# Patient Record
Sex: MALE | Race: WHITE | Employment: OTHER | URBAN - METROPOLITAN AREA
[De-identification: names, ages, dates, MRNs, and addresses within clinical notes are randomized per-mention and may not be internally consistent; named-entity substitution may affect disease eponyms.]

---

## 2019-01-30 ENCOUNTER — HOSPITAL ENCOUNTER (EMERGENCY)
Age: 69
Discharge: HOME | End: 2019-01-30
Attending: EMERGENCY MEDICINE
Payer: MEDICARE

## 2019-01-30 ENCOUNTER — APPOINTMENT (OUTPATIENT)
Dept: GENERAL RADIOLOGY | Age: 69
End: 2019-01-30
Payer: MEDICARE

## 2019-01-30 VITALS
BODY MASS INDEX: 49.44 KG/M2 | DIASTOLIC BLOOD PRESSURE: 78 MMHG | HEIGHT: 67 IN | WEIGHT: 315 LBS | SYSTOLIC BLOOD PRESSURE: 185 MMHG | RESPIRATION RATE: 20 BRPM | TEMPERATURE: 98.4 F | OXYGEN SATURATION: 95 % | HEART RATE: 72 BPM

## 2019-01-30 DIAGNOSIS — R06.02 SHORTNESS OF BREATH: Primary | ICD-10-CM

## 2019-01-30 LAB
ANION GAP SERPL CALCULATED.3IONS-SCNC: 16 MMOL/L (ref 3–16)
BASOPHILS ABSOLUTE: 0.1 K/UL (ref 0–0.2)
BASOPHILS RELATIVE PERCENT: 0.6 %
BUN BLDV-MCNC: 12 MG/DL (ref 7–20)
CALCIUM SERPL-MCNC: 9.7 MG/DL (ref 8.3–10.6)
CHLORIDE BLD-SCNC: 97 MMOL/L (ref 99–110)
CO2: 26 MMOL/L (ref 21–32)
CREAT SERPL-MCNC: 0.7 MG/DL (ref 0.8–1.3)
EOSINOPHILS ABSOLUTE: 0.2 K/UL (ref 0–0.6)
EOSINOPHILS RELATIVE PERCENT: 2 %
GFR AFRICAN AMERICAN: >60
GFR NON-AFRICAN AMERICAN: >60
GLUCOSE BLD-MCNC: 197 MG/DL (ref 70–99)
HCT VFR BLD CALC: 45.5 % (ref 40.5–52.5)
HEMOGLOBIN: 15.1 G/DL (ref 13.5–17.5)
LYMPHOCYTES ABSOLUTE: 1.7 K/UL (ref 1–5.1)
LYMPHOCYTES RELATIVE PERCENT: 20.5 %
MAGNESIUM: 1.7 MG/DL (ref 1.8–2.4)
MCH RBC QN AUTO: 31.8 PG (ref 26–34)
MCHC RBC AUTO-ENTMCNC: 33.2 G/DL (ref 31–36)
MCV RBC AUTO: 95.9 FL (ref 80–100)
MONOCYTES ABSOLUTE: 0.8 K/UL (ref 0–1.3)
MONOCYTES RELATIVE PERCENT: 9.1 %
NEUTROPHILS ABSOLUTE: 5.6 K/UL (ref 1.7–7.7)
NEUTROPHILS RELATIVE PERCENT: 67.8 %
PDW BLD-RTO: 14.6 % (ref 12.4–15.4)
PLATELET # BLD: 223 K/UL (ref 135–450)
PMV BLD AUTO: 7.7 FL (ref 5–10.5)
POTASSIUM SERPL-SCNC: 3.9 MMOL/L (ref 3.5–5.1)
PRO-BNP: 21 PG/ML (ref 0–124)
RAPID INFLUENZA  B AGN: NEGATIVE
RAPID INFLUENZA A AGN: NEGATIVE
RBC # BLD: 4.75 M/UL (ref 4.2–5.9)
SODIUM BLD-SCNC: 139 MMOL/L (ref 136–145)
TROPONIN: <0.01 NG/ML
WBC # BLD: 8.3 K/UL (ref 4–11)

## 2019-01-30 PROCEDURE — 84443 ASSAY THYROID STIM HORMONE: CPT

## 2019-01-30 PROCEDURE — 83735 ASSAY OF MAGNESIUM: CPT

## 2019-01-30 PROCEDURE — 83880 ASSAY OF NATRIURETIC PEPTIDE: CPT

## 2019-01-30 PROCEDURE — 80048 BASIC METABOLIC PNL TOTAL CA: CPT

## 2019-01-30 PROCEDURE — 93005 ELECTROCARDIOGRAM TRACING: CPT | Performed by: EMERGENCY MEDICINE

## 2019-01-30 PROCEDURE — 71046 X-RAY EXAM CHEST 2 VIEWS: CPT

## 2019-01-30 PROCEDURE — 85025 COMPLETE CBC W/AUTO DIFF WBC: CPT

## 2019-01-30 PROCEDURE — 87804 INFLUENZA ASSAY W/OPTIC: CPT

## 2019-01-30 PROCEDURE — 99285 EMERGENCY DEPT VISIT HI MDM: CPT

## 2019-01-30 PROCEDURE — 84484 ASSAY OF TROPONIN QUANT: CPT

## 2019-01-30 RX ORDER — ASPIRIN 81 MG/1
81 TABLET ORAL DAILY
COMMUNITY

## 2019-01-30 RX ORDER — OXYCODONE HYDROCHLORIDE 20 MG/1
15 TABLET ORAL EVERY 6 HOURS PRN
COMMUNITY

## 2019-01-30 RX ORDER — PROMETHAZINE HYDROCHLORIDE 25 MG/1
25 TABLET ORAL
COMMUNITY
Start: 2018-04-12

## 2019-01-30 RX ORDER — DESVENLAFAXINE 100 MG/1
100 TABLET, EXTENDED RELEASE ORAL 2 TIMES DAILY
COMMUNITY
Start: 2018-12-10

## 2019-01-30 RX ORDER — TESTOSTERONE CYPIONATE 200 MG/ML
100 INJECTION INTRAMUSCULAR WEEKLY
COMMUNITY
Start: 2018-12-10 | End: 2020-09-15

## 2019-01-30 RX ORDER — ALBUTEROL SULFATE 90 UG/1
2 AEROSOL, METERED RESPIRATORY (INHALATION)
Status: ON HOLD | COMMUNITY
Start: 2018-10-11 | End: 2020-10-16

## 2019-01-30 RX ORDER — TAMSULOSIN HYDROCHLORIDE 0.4 MG/1
0.4 CAPSULE ORAL 2 TIMES DAILY
COMMUNITY
Start: 2018-12-20

## 2019-01-30 RX ORDER — AMLODIPINE BESYLATE 5 MG/1
10 TABLET ORAL DAILY
COMMUNITY
Start: 2018-12-17

## 2019-01-30 RX ORDER — OXYCODONE HYDROCHLORIDE 20 MG/1
20 TABLET ORAL EVERY 6 HOURS PRN
COMMUNITY
Start: 2019-01-27 | End: 2019-01-30 | Stop reason: SDUPTHER

## 2019-01-30 RX ORDER — AMOXICILLIN 500 MG
1 CAPSULE ORAL 2 TIMES DAILY
COMMUNITY

## 2019-01-30 RX ORDER — LISINOPRIL 20 MG/1
20 TABLET ORAL 2 TIMES DAILY
COMMUNITY
Start: 2018-12-20

## 2019-01-30 RX ORDER — TADALAFIL 20 MG/1
20 TABLET ORAL PRN
COMMUNITY
Start: 2018-11-08

## 2019-01-30 RX ORDER — IBUPROFEN 800 MG/1
800 TABLET ORAL 3 TIMES DAILY
COMMUNITY
Start: 2018-09-11

## 2019-01-30 RX ORDER — TOPIRAMATE 25 MG/1
25 TABLET ORAL DAILY
Status: ON HOLD | COMMUNITY
Start: 2018-10-26 | End: 2020-09-15

## 2019-01-30 RX ORDER — CARVEDILOL 12.5 MG/1
25 TABLET ORAL 2 TIMES DAILY
COMMUNITY
Start: 2018-12-20

## 2019-01-31 LAB
EKG ATRIAL RATE: 71 BPM
EKG DIAGNOSIS: NORMAL
EKG P AXIS: 44 DEGREES
EKG P-R INTERVAL: 184 MS
EKG Q-T INTERVAL: 348 MS
EKG QRS DURATION: 100 MS
EKG QTC CALCULATION (BAZETT): 378 MS
EKG R AXIS: 13 DEGREES
EKG T AXIS: 61 DEGREES
EKG VENTRICULAR RATE: 71 BPM

## 2019-02-01 LAB — TSH REFLEX: 1.73 UIU/ML (ref 0.27–4.2)

## 2019-11-08 ENCOUNTER — HOSPITAL ENCOUNTER (OUTPATIENT)
Dept: CT IMAGING | Age: 69
Discharge: HOME | End: 2019-11-08
Payer: MEDICARE

## 2019-11-08 ENCOUNTER — HOSPITAL ENCOUNTER (OUTPATIENT)
Dept: GENERAL RADIOLOGY | Age: 69
Discharge: HOME | End: 2019-11-08
Payer: MEDICARE

## 2019-11-08 VITALS
DIASTOLIC BLOOD PRESSURE: 69 MMHG | RESPIRATION RATE: 16 BRPM | TEMPERATURE: 97.9 F | SYSTOLIC BLOOD PRESSURE: 143 MMHG | OXYGEN SATURATION: 94 % | HEART RATE: 79 BPM

## 2019-11-08 DIAGNOSIS — M54.16 LUMBAR RADICULOPATHY: ICD-10-CM

## 2019-11-08 PROCEDURE — 72265 MYELOGRAPHY L-S SPINE: CPT

## 2019-11-08 PROCEDURE — 6360000004 HC RX CONTRAST MEDICATION: Performed by: NURSE PRACTITIONER

## 2019-11-08 PROCEDURE — 72132 CT LUMBAR SPINE W/DYE: CPT

## 2019-11-08 PROCEDURE — 2500000003 HC RX 250 WO HCPCS: Performed by: NURSE PRACTITIONER

## 2019-11-08 PROCEDURE — 7100000011 HC PHASE II RECOVERY - ADDTL 15 MIN

## 2019-11-08 PROCEDURE — 7100000010 HC PHASE II RECOVERY - FIRST 15 MIN

## 2019-11-08 RX ORDER — LIDOCAINE HYDROCHLORIDE 10 MG/ML
5 INJECTION, SOLUTION EPIDURAL; INFILTRATION; INTRACAUDAL; PERINEURAL ONCE
Status: COMPLETED | OUTPATIENT
Start: 2019-11-08 | End: 2019-11-08

## 2019-11-08 RX ADMIN — IOHEXOL 12 ML: 240 INJECTION, SOLUTION INTRATHECAL; INTRAVASCULAR; INTRAVENOUS; ORAL at 10:37

## 2019-11-08 RX ADMIN — LIDOCAINE HYDROCHLORIDE 5 ML: 10 INJECTION, SOLUTION EPIDURAL; INFILTRATION; INTRACAUDAL; PERINEURAL at 10:36

## 2019-11-08 ASSESSMENT — PAIN SCALES - GENERAL: PAINLEVEL_OUTOF10: 0

## 2020-09-02 ENCOUNTER — HOSPITAL ENCOUNTER (EMERGENCY)
Age: 70
Discharge: HOME | End: 2020-09-02
Attending: EMERGENCY MEDICINE
Payer: MEDICARE

## 2020-09-02 ENCOUNTER — APPOINTMENT (OUTPATIENT)
Dept: GENERAL RADIOLOGY | Age: 70
End: 2020-09-02
Payer: MEDICARE

## 2020-09-02 VITALS
RESPIRATION RATE: 18 BRPM | HEART RATE: 64 BPM | TEMPERATURE: 98.3 F | DIASTOLIC BLOOD PRESSURE: 63 MMHG | SYSTOLIC BLOOD PRESSURE: 138 MMHG | OXYGEN SATURATION: 94 %

## 2020-09-02 LAB
ANION GAP SERPL CALCULATED.3IONS-SCNC: 11 MMOL/L (ref 3–16)
BASE EXCESS VENOUS: 1.3 MMOL/L (ref -2–3)
BASOPHILS ABSOLUTE: 0.1 K/UL (ref 0–0.2)
BASOPHILS RELATIVE PERCENT: 0.6 %
BUN BLDV-MCNC: 27 MG/DL (ref 7–20)
CALCIUM SERPL-MCNC: 9.9 MG/DL (ref 8.3–10.6)
CARBOXYHEMOGLOBIN: 1.8 % (ref 0–1.5)
CHLORIDE BLD-SCNC: 99 MMOL/L (ref 99–110)
CHP ED QC CHECK: YES
CO2: 26 MMOL/L (ref 21–32)
CREAT SERPL-MCNC: 1.3 MG/DL (ref 0.8–1.3)
EKG ATRIAL RATE: 67 BPM
EKG DIAGNOSIS: NORMAL
EKG P AXIS: 56 DEGREES
EKG P-R INTERVAL: 172 MS
EKG Q-T INTERVAL: 344 MS
EKG QRS DURATION: 96 MS
EKG QTC CALCULATION (BAZETT): 363 MS
EKG R AXIS: 4 DEGREES
EKG T AXIS: 53 DEGREES
EKG VENTRICULAR RATE: 67 BPM
EOSINOPHILS ABSOLUTE: 0.2 K/UL (ref 0–0.6)
EOSINOPHILS RELATIVE PERCENT: 2 %
GFR AFRICAN AMERICAN: >60
GFR NON-AFRICAN AMERICAN: 55
GLUCOSE BLD-MCNC: 129 MG/DL (ref 70–99)
GLUCOSE BLD-MCNC: 135 MG/DL
GLUCOSE BLD-MCNC: 135 MG/DL (ref 70–99)
HCO3 VENOUS: 26.5 MMOL/L (ref 24–28)
HCT VFR BLD CALC: 48.3 % (ref 40.5–52.5)
HEMOGLOBIN, VEN, REDUCED: 10.6 %
HEMOGLOBIN: 16.4 G/DL (ref 13.5–17.5)
LYMPHOCYTES ABSOLUTE: 1.9 K/UL (ref 1–5.1)
LYMPHOCYTES RELATIVE PERCENT: 17.7 %
MCH RBC QN AUTO: 32.9 PG (ref 26–34)
MCHC RBC AUTO-ENTMCNC: 34 G/DL (ref 31–36)
MCV RBC AUTO: 96.8 FL (ref 80–100)
METHEMOGLOBIN VENOUS: 0.9 % (ref 0–1.5)
MONOCYTES ABSOLUTE: 1.1 K/UL (ref 0–1.3)
MONOCYTES RELATIVE PERCENT: 10.3 %
NEUTROPHILS ABSOLUTE: 7.3 K/UL (ref 1.7–7.7)
NEUTROPHILS RELATIVE PERCENT: 69.4 %
O2 SAT, VEN: 89 %
PCO2, VEN: 45.6 MMHG (ref 41–51)
PDW BLD-RTO: 15.1 % (ref 12.4–15.4)
PERFORMED ON: ABNORMAL
PH VENOUS: 7.38 (ref 7.35–7.45)
PLATELET # BLD: 195 K/UL (ref 135–450)
PMV BLD AUTO: 7.8 FL (ref 5–10.5)
PO2, VEN: 60.8 MMHG (ref 25–40)
POTASSIUM SERPL-SCNC: 4.4 MMOL/L (ref 3.5–5.1)
PRO-BNP: 27 PG/ML (ref 0–124)
RBC # BLD: 4.99 M/UL (ref 4.2–5.9)
SODIUM BLD-SCNC: 136 MMOL/L (ref 136–145)
TCO2 CALC VENOUS: 28 MMOL/L
TROPONIN: <0.01 NG/ML
WBC # BLD: 10.6 K/UL (ref 4–11)

## 2020-09-02 PROCEDURE — 99285 EMERGENCY DEPT VISIT HI MDM: CPT

## 2020-09-02 PROCEDURE — 83880 ASSAY OF NATRIURETIC PEPTIDE: CPT

## 2020-09-02 PROCEDURE — 93005 ELECTROCARDIOGRAM TRACING: CPT | Performed by: EMERGENCY MEDICINE

## 2020-09-02 PROCEDURE — 71046 X-RAY EXAM CHEST 2 VIEWS: CPT

## 2020-09-02 PROCEDURE — 85025 COMPLETE CBC W/AUTO DIFF WBC: CPT

## 2020-09-02 PROCEDURE — U0003 INFECTIOUS AGENT DETECTION BY NUCLEIC ACID (DNA OR RNA); SEVERE ACUTE RESPIRATORY SYNDROME CORONAVIRUS 2 (SARS-COV-2) (CORONAVIRUS DISEASE [COVID-19]), AMPLIFIED PROBE TECHNIQUE, MAKING USE OF HIGH THROUGHPUT TECHNOLOGIES AS DESCRIBED BY CMS-2020-01-R: HCPCS

## 2020-09-02 PROCEDURE — 36415 COLL VENOUS BLD VENIPUNCTURE: CPT

## 2020-09-02 PROCEDURE — 82803 BLOOD GASES ANY COMBINATION: CPT

## 2020-09-02 PROCEDURE — 6370000000 HC RX 637 (ALT 250 FOR IP): Performed by: EMERGENCY MEDICINE

## 2020-09-02 PROCEDURE — 84484 ASSAY OF TROPONIN QUANT: CPT

## 2020-09-02 PROCEDURE — 80048 BASIC METABOLIC PNL TOTAL CA: CPT

## 2020-09-02 RX ORDER — HYDROXYZINE PAMOATE 25 MG/1
25 CAPSULE ORAL ONCE
Status: COMPLETED | OUTPATIENT
Start: 2020-09-02 | End: 2020-09-02

## 2020-09-02 RX ADMIN — HYDROXYZINE PAMOATE 25 MG: 25 CAPSULE ORAL at 05:17

## 2020-09-02 ASSESSMENT — ENCOUNTER SYMPTOMS
COUGH: 0
EYES NEGATIVE: 1
GASTROINTESTINAL NEGATIVE: 1
SHORTNESS OF BREATH: 1

## 2020-09-02 NOTE — ED PROVIDER NOTES
[address] [facility]          ATTENDING PHYSICIAN NOTE       Date of evaluation: 9/2/2020    Chief Complaint     Panic Attack and Shortness of Breath (with lying down at home but also states SOB sitting up in bed while here)      History of Present Illness     Valerio Tong is a 78 y.o. male who presents to the emergency department complaining of shortness of breath. Patient states that he is felt fatigued throughout the day today and had increase of his chronic back pain but he attributed this to moving several boxes during the day. He states tonight when he tried to go to sleep he felt very short of breath whenever he lay down flat. He states he does have some shortness of breath when sitting up as well but is worse with laying flat. He denies any chest pain or pressure. He denies any fevers or chills. He denies any cough. He denies any swelling or pain in his extremities. He denies any recent sick contacts. He states he has had something similar happened to him in the past but does not know the etiology. Review of Systems     Review of Systems   Constitutional: Negative. HENT: Negative. Eyes: Negative. Respiratory: Positive for shortness of breath. Negative for cough. Cardiovascular: Negative. Gastrointestinal: Negative. Genitourinary: Negative. Musculoskeletal: Negative. Neurological: Negative. All other systems reviewed and are negative. Past Medical, Surgical, Family, and Social History     He has a past medical history of Diabetes mellitus ([address] Hypertension, and Sleep apnea. He has no past surgical history on file. His family history is not on file. He reports that he has never smoked. He has never used smokeless tobacco. He reports current alcohol use. He reports current drug use. Drug: Marijuana.     Medications     Current Discharge Medication List      CONTINUE these medications which have NOT CHANGED    Details albuterol sulfate HFA (PROAIR HFA) 108 (90 Base) MCG/ACT inhaler Inhale 2 puffs into the lungs      amLODIPine (NORVASC) 5 MG tablet Take 5 mg by mouth daily      aspirin 81 MG EC tablet Take 81 mg by mouth daily      carvedilol (COREG) 12.5 MG tablet Take 12.5 mg by mouth 2 times daily      vitamin D (CHOLECALCIFEROL) 31951 units CAPS capsule Take 10,000 Units by mouth 2 times daily      desvenlafaxine succinate (PRISTIQ) 100 MG TB24 extended release tablet Take 100 mg by mouth 2 times daily      ibuprofen (IBU) 800 MG tablet Take 800 mg by mouth three times daily      insulin NPH (HUMULIN N) 100 UNIT/ML injection vial Inject 60 Units into the skin 2 times daily      lisinopril (PRINIVIL;ZESTRIL) 20 MG tablet Take 20 mg by mouth 2 times daily      metFORMIN (GLUCOPHAGE) 1000 MG tablet Take 1,000 mg by mouth 2 times daily (with meals)      Multiple Vitamin (MULTI-DAY VITAMINS PO) Take 1 tablet by mouth 2 times daily      Omega-3 Fatty Acids (FISH OIL) 1200 MG CAPS Take 1 capsule by mouth 2 times daily      oxyCODONE (ROXICODONE) 20 MG immediate release tablet Take 20 mg by mouth every 6 hours as needed for Pain. .      promethazine (PHENERGAN) 25 MG tablet Take 25 mg by mouth every 4-6 hours as needed for Nausea      tadalafil (CIALIS) 20 MG tablet Take 20 mg by mouth as needed      tamsulosin (FLOMAX) 0.4 MG capsule Take 0.4 mg by mouth 2 times daily      testosterone cypionate (DEPOTESTOTERONE CYPIONATE) 200 MG/ML injection Inject 200 mg into the muscle every 21 days. Alma Boop topiramate (TOPAMAX) 25 MG tablet Take 25 mg by mouth daily             Allergies     He has No Known Allergies. Physical Exam     INITIAL VITALS: BP: (!) 154/68, Temp: 98.3 °F (36.8 °C), Pulse: 64, Resp: 18, SpO2: 91 %   Physical Exam  Vitals signs and nursing note reviewed. Constitutional:       General: He is not in acute distress. Appearance: He is obese. HENT:      Head: Normocephalic and atraumatic.       Mouth/Throat: Mouth: Mucous membranes are moist.      Pharynx: No oropharyngeal exudate. Eyes:      General: No scleral icterus. Extraocular Movements: Extraocular movements intact. Conjunctiva/sclera: Conjunctivae normal.      Pupils: Pupils are equal, round, and reactive to light. Neck:      Musculoskeletal: Normal range of motion and neck supple. Cardiovascular:      Rate and Rhythm: Normal rate and regular rhythm. Heart sounds: Normal heart sounds. Pulmonary:      Effort: Pulmonary effort is normal.      Breath sounds: Normal breath sounds. No wheezing, rhonchi or rales. Abdominal:      General: Bowel sounds are normal.      Palpations: Abdomen is soft. Tenderness: There is no abdominal tenderness. There is no guarding or rebound. Musculoskeletal: Normal range of motion. Right lower leg: Edema present. Left lower leg: Edema present. Comments: Trace to 1+ pitting edema to the bilateral lower extremities. Skin:     General: Skin is warm and dry. Neurological:      General: No focal deficit present. Mental Status: He is alert and oriented to person, place, and time. Cranial Nerves: No cranial nerve deficit. Motor: No weakness. Coordination: Coordination normal.         Diagnostic Results     EKG   EKG as interpreted by me shows patient to be in a normal sinus rhythm with a normal axis, normal LA and QT intervals, normal QRS duration, no ST segment abnormalities, no T wave abnormalities, occasional PVCs present. RADIOLOGY:  XR CHEST (2 VW)   Final Result   Cardiomegaly. No acute pulmonary findings.               LABS:   Results for orders placed or performed during the hospital encounter of 09/02/20   CBC auto differential   Result Value Ref Range    WBC 10.6 4.0 - 11.0 K/uL    RBC 4.99 4.20 - 5.90 M/uL    Hemoglobin 16.4 13.5 - 17.5 g/dL    Hematocrit 48.3 40.5 - 52.5 %    MCV 96.8 80.0 - 100.0 fL    MCH 32.9 26.0 - 34.0 pg    MCHC 34.0 31.0 - 36.0 g/dL RDW 15.1 12.4 - 15.4 %    Platelets 669 115 - 216 K/uL    MPV 7.8 5.0 - 10.5 fL    Neutrophils % 69.4 %    Lymphocytes % 17.7 %    Monocytes % 10.3 %    Eosinophils % 2.0 %    Basophils % 0.6 %    Neutrophils Absolute 7.3 1.7 - 7.7 K/uL    Lymphocytes Absolute 1.9 1.0 - 5.1 K/uL    Monocytes Absolute 1.1 0.0 - 1.3 K/uL    Eosinophils Absolute 0.2 0.0 - 0.6 K/uL    Basophils Absolute 0.1 0.0 - 0.2 K/uL   Basic Metabolic Panel (EP - 1)   Result Value Ref Range    Sodium 136 136 - 145 mmol/L    Potassium 4.4 3.5 - 5.1 mmol/L    Chloride 99 99 - 110 mmol/L    CO2 26 21 - 32 mmol/L    Anion Gap 11 3 - 16    Glucose 129 (H) 70 - 99 mg/dL    BUN 27 (H) 7 - 20 mg/dL    CREATININE 1.3 0.8 - 1.3 mg/dL    GFR Non-African American 55 (A) >60    GFR African American >60 >60    Calcium 9.9 8.3 - 10.6 mg/dL   Troponin   Result Value Ref Range    Troponin <0.01 <0.01 ng/mL   Brain Natriuretic Peptide   Result Value Ref Range    Pro-BNP 27 0 - 124 pg/mL   Blood Gas, Venous   Result Value Ref Range    pH, Lake 7.381 7.350 - 7.450    pCO2, Lake 45.6 41.0 - 51.0 mmHg    pO2, Lake 60.8 (H) 25.0 - 40.0 mmHg    HCO3, Venous 26.5 24.0 - 28.0 mmol/L    Base Excess, Lake 1.3 -2.0 - 3.0 mmol/L    O2 Sat, Lake 89 Not established %    Carboxyhemoglobin 1.8 (H) 0.0 - 1.5 %    MetHgb, Lake 0.9 0.0 - 1.5 %    TC02 (Calc), [place] 28 mmol/L    Hemoglobin, Lake, Reduced 10.60 %     RECENT VITALS:  BP: 138/63,Temp: 98.3 °F (36.8 °C), Pulse: 64, Resp: 18, SpO2: 94 %     Procedures     N/A    ED Course     Nursing Notes, Past Medical Hx, Past Surgical Hx, Social Hx,Allergies, and Family Hx were reviewed. patient was given the following medications:  Orders Placed This Encounter   Medications    hydrOXYzine (VISTARIL) capsule 25 mg       CONSULTS:  None    MEDICAL DECISIONMAKING / ASSESSMENT / Edgard Lenin is a 78 y.o. male presents complaining of shortness of breath.   Patient states he is felt short of breath and had increased chronic pain over the last day but tonight was unable to fall asleep when laying down flat. Patient has clear breath sounds and normal heart sounds. Vital signs are stable. Chest x-ray shows no acute airspace disease. Laboratory studies are unremarkable including negative troponin, normal BNP, and normal venous blood gas. Patient did request to nurse and myself multiple times for Ativan to help with his symptoms, so I am concerned for possibility of drug-seeking behavior. Patient was given Vistaril for symptomatic relief. COVID swab was sent and the patient will be instructed to self quarantine. Patient will be instructed to continue his current medications, follow-up with his primary care provider as well as his pain management specialist.    Clinical Impression     1.  Shortness of breath        Disposition     PATIENT REFERRED TO:  [facility], [address] [place] [address]  [phone]            DISCHARGE MEDICATIONS:  Current Discharge Medication List          DISPOSITION          Cas Lentz MD  09/02/20 8063

## 2020-09-02 NOTE — ED NOTES
Pt states that he is here because he feels extremely anxious. Pt states he feels as though he cannot catch his breath when he lays down and then he stated that he cannot catch his breath when he is standing either. 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 Pt also stated that his pain doctor has been decreasing his pain medications and he dosen't understand why because he was good for 29 years and now he is in pain all of the time because of his career and he just wants to feel normal and he cant feel normal without narcotic pain medication. Pt stated that he has been arguing with his pain management doctor lately because he keeps decreasing his pain medication and he has been spending more and more time in bed because he is in so much pain and his doctor dosen't understand and he feels like everything would be better if his doctor would give him his normal dose of pain medication. Pt asked if the ED MD would be willing to give him some here. I informed the pt that I would speak with the MD about IVP Ativan and pain medication.       December CHRIS Mayes  09/02/20 0629

## 2020-09-02 NOTE — ED NOTES
EKG completed and reviewed by Dr. Jamee Flores. Dr. Jamee Flores also aware that pt is requesting pain medication along with IVP Ativan.       Clarence Leal RN  09/02/20 8104

## 2020-09-04 LAB — SARS-COV-2, NAA: NOT DETECTED

## 2020-09-09 ENCOUNTER — OFFICE VISIT (OUTPATIENT)
Dept: PRIMARY CARE CLINIC | Age: 70
End: 2020-09-09
Payer: MEDICARE

## 2020-09-09 PROCEDURE — G8421 BMI NOT CALCULATED: HCPCS | Performed by: NURSE PRACTITIONER

## 2020-09-09 PROCEDURE — 99211 OFF/OP EST MAY X REQ PHY/QHP: CPT | Performed by: NURSE PRACTITIONER

## 2020-09-09 PROCEDURE — G8428 CUR MEDS NOT DOCUMENT: HCPCS | Performed by: NURSE PRACTITIONER

## 2020-09-11 LAB — SARS-COV-2, NAA: NOT DETECTED

## 2020-09-14 NOTE — PRE-PROCEDURE INSTRUCTIONS
PATIENT REACHED   YES_x___NO____    PREOP INSTUCTIONS LEFT ON VM NUMBER_______________      FXTO__[id number]_______ TIME_1145________ARRIVAL_1100_______PLACE____________  NOTHING TO EAT OR DRINK  AFTER MIDNIGHT THE EVENING PRIOR OR AS INSTRUCTED BY YOUR DR.  Catherine Parks NEED A RESPONSIBLE ADULT AGE 18 OR OLDER TO DRIVE YOU HOME  PLEASE BRING INSURANCE CARD. PICTURE ID AND COMPLETE LIST OF MEDS  WEAR LOOSE COMFORTABLE CLOTHING  FOLLOW ANY INSTRUCTIONS YOUR DRS OFFICE HAS GIVEN YOU,INCLUDING WHAT MEDICATIONS TO TAKE THE AM OF PROCEDURE AND WHEN AND IF YOU NEED TO STOP ANY BLOOD THINNERS. IF YOU HAVE QUESTIONS REGARDING THIS CALL THE OFFICE  THE GOAL BLOOD SUGAR THE AM OF PROCEDURE  OR LESS ABOVE THAT THE PROCEDURE MAY BE CANCELLED  ANY QUESTIONS CALL YOUR DOCTOR. ALSO,PLEASE READ THE INSTRUCTION PACKET FROM YOUR DR IF YOU RECEIVED ONE.   SPINE INTERVENTION NUMBER [phone]

## 2020-09-15 ENCOUNTER — HOSPITAL ENCOUNTER (OUTPATIENT)
Age: 70
Setting detail: OUTPATIENT SURGERY
Discharge: HOME | End: 2020-09-15
Attending: ANESTHESIOLOGY | Admitting: ANESTHESIOLOGY
Payer: MEDICARE

## 2020-09-15 ENCOUNTER — APPOINTMENT (OUTPATIENT)
Dept: GENERAL RADIOLOGY | Age: 70
End: 2020-09-15
Attending: ANESTHESIOLOGY
Payer: MEDICARE

## 2020-09-15 VITALS
BODY MASS INDEX: 47.87 KG/M2 | TEMPERATURE: 98.6 F | OXYGEN SATURATION: 93 % | SYSTOLIC BLOOD PRESSURE: 128 MMHG | HEIGHT: 67 IN | WEIGHT: 305 LBS | DIASTOLIC BLOOD PRESSURE: 34 MMHG | HEART RATE: 62 BPM | RESPIRATION RATE: 16 BRPM

## 2020-09-15 LAB
GLUCOSE BLD-MCNC: 150 MG/DL (ref 70–99)
PERFORMED ON: ABNORMAL

## 2020-09-15 PROCEDURE — 2709999900 HC NON-CHARGEABLE SUPPLY: Performed by: ANESTHESIOLOGY

## 2020-09-15 PROCEDURE — 77003 FLUOROGUIDE FOR SPINE INJECT: CPT

## 2020-09-15 PROCEDURE — 3610000054 HC PAIN LEVEL 3 BASE (NON-OR): Performed by: ANESTHESIOLOGY

## 2020-09-15 PROCEDURE — 2580000003 HC RX 258: Performed by: ANESTHESIOLOGY

## 2020-09-15 PROCEDURE — 3610000055 HC PAIN LEVEL 3 ADDL 15 MIN (NON-OR): Performed by: ANESTHESIOLOGY

## 2020-09-15 PROCEDURE — 6360000002 HC RX W HCPCS: Performed by: ANESTHESIOLOGY

## 2020-09-15 RX ORDER — ATORVASTATIN CALCIUM 20 MG/1
20 TABLET, FILM COATED ORAL DAILY
COMMUNITY

## 2020-09-15 RX ORDER — SODIUM CHLORIDE 9 MG/ML
1000 INJECTION, SOLUTION INTRAVENOUS ONCE
Status: COMPLETED | OUTPATIENT
Start: 2020-09-15 | End: 2020-09-15

## 2020-09-15 RX ORDER — MORPHINE SULFATE 1 MG/ML
INJECTION, SOLUTION EPIDURAL; INTRATHECAL; INTRAVENOUS
Status: COMPLETED | OUTPATIENT
Start: 2020-09-15 | End: 2020-09-15

## 2020-09-15 RX ADMIN — SODIUM CHLORIDE 1000 ML: 9 INJECTION, SOLUTION INTRAVENOUS at 11:47

## 2020-09-15 ASSESSMENT — PAIN SCALES - GENERAL
PAINLEVEL_OUTOF10: 8
PAINLEVEL_OUTOF10: 4

## 2020-09-15 ASSESSMENT — PAIN DESCRIPTION - PAIN TYPE: TYPE: CHRONIC PAIN

## 2020-09-15 ASSESSMENT — PAIN - FUNCTIONAL ASSESSMENT: PAIN_FUNCTIONAL_ASSESSMENT: 0-10

## 2020-09-15 ASSESSMENT — PAIN DESCRIPTION - DESCRIPTORS: DESCRIPTORS: NUMBNESS;TINGLING

## 2020-09-15 NOTE — PROGRESS NOTES
Pt able to walk to car at discharge without difficulty. Pt standing and walking in department for 10 minutes prior to discharge without difficulty.

## 2020-09-15 NOTE — PROGRESS NOTES
Pt is up walking, eating and drinking. Pt states his pain is 4 out of 10 from 8 out of 10 prior to procedure. Pt states he is moving easier.

## 2020-09-22 NOTE — OP NOTE
Operative Note      Patient: Tatyana Baig  YOB: 1950  MRN: [id number]    Date of Procedure: 9/15/2020    Pre-Op Diagnosis: M96.1    Post-Op Diagnosis: Same       Procedure(s):  PAIN PUMP TRIAL WITH FLUOROSCOPY    Surgeon(s):  Bigg Griffin MD    Assistant:   * No surgical staff found *    Anesthesia: Local    Estimated Blood Loss (mL): Minimal    Complications: None    Specimens:   * No specimens in log *    Implants:  * No implants in log *      Drains: * No LDAs found *    Findings:     Detailed Description of Procedure:   INDICATIONS: The patient has a longstanding history of chronic pain. She failed all other therapies, including medical therapy; unable to tolerate it due to side effects. The decision was made and approval was obtained from his insurance for a morphine epidural infusion. If that helped the patient and his pain, then he would be eligible for intrathecal pump implant. DESCRIPTION OF PROCEDURE:     After obtaining informed consent from the patient, the patient was placed on a prone position on the procedure table. The back was prepped under strict Aseptic technique, and prepped in the usual sterile fashion. Under fluoroscopy guidance about L1-L2 was identified. (Patient had an L2-L5 fusion)  A mixture of Lidocaine, 3 mL of 1%, was used to anesthetize the skin. A 14-gauge Tuohy needle was inserted through the skin interlaminarly until we got to the epidural space with loss of Resistance to air. There was negative CSF and negative heme. After negative aspiration I injected 3 ml of Omnipeque 250 to confirm the spread of the dye in the epidural space.   Then, I injected 4 ml=4 mg of PF morphine, , the patient will be evaluated for 4 hours and according to the patient the pain went down to 4 from 9 = 60 % within the first hour, the patient was monitored for 4 hours and then was D/C form the hospital.      Electronically signed by Gloria Rodriguez MD on 9/22/2020 at 9:48 AM

## 2020-09-22 NOTE — H&P
Chief Complaints:   1. Lower back pain. 2. Right thigh pain. HPI:   Interim History:     New Events:   Patient presents today for his pump trial      Mr Ryan Deleon is a 77 y/o male referred to our clinic by Dr. Doyle Benson for management of low back pain. He has a PMH of DMII, HTN, KESHAV. His pain started 29 years ago when he ruptured a lumbar disc. He used to work as a nurse. He saw a Dr. Shahida Bansal (pain) [facility] who treated him with medication exclusively until he relocated to  [address] [place] [address]. He saw Dr. Aaron Carcamo around 2000 who did a MT SCS trial and later referred him for the implant which was done by Dr. Alissa Adames. He reports that Dr. Babetta Dubin took out the SCS 1 year ago because he was not using the SCS. He had lumbar laminectomy around 1992 at L3-L4. In [place] he went to [place] and saw Dr. Evelin Perdomo who did a  laminectomy at L1-L2 L5. He was on [facility] following his last surgery with [place]. He saw Dr. Madeline Leslie in [place] roughly 17 years ago who did LESI's. He saw Dr. Laurie Kay after seeing Dr. Madeline Leslie for a  brief period. He saw Dr. Babetta Dubin around 2016 who also did LESI's. He tells me that he left Dr. Babetta Dubin because he was weaning his  medications too much. Overall he tells me that he has had about 30-40 TAJ's. Denies acupuncture, pain pump implant (IDDS). Pain management currently consists of oxycodone 20mg, he takes 4.5 tabs per day. He denies alcohol consumption; he denies smoking cigarettes, denies illicit drug use. His pain is located in the low back without radiation. He describes his pain as stabbing. Associated symptoms include numbness/tingling. Aggravating factors include standing and walking. Alleviating factors include rest.   The duration of the pain is constant. Function goals include being able to work with less pain. He rates his pain a 2/10 VAS with pain medication and 8/10 VAS without pain medication. He rates his current pain a 2/10 VAS today.    He denies loss of bowel and bladder control. He denies constipation. EMG 2020 BLE   1. mild to moderate peripheral neuropathy probably due to DM. mild chronic/residual left L5 radiculopathy. New Patient visit:   New Patient Visit 8-5-2020   Mr Leighann Tena is a 77 y/o male referred to our clinic by Dr. Geovany Salmeron for management of low back pain. He has a PMH of DMII, HTN, KESHAV. His pain started 29 years ago when he ruptured a lumbar disc. He used to work as a nurse. He saw a Dr. Samra Ashley (pain) in [facility] who treated him with medication exclusively until he relocated to  [facility]. He saw Dr. Effie Yu around 2000 who did a MT SCS trial and later referred him for the implant which was done by Dr. Reyes Salgado. He reports that Dr. Angelito Almendarez took out the SCS 1 year ago because he was not using the SCS. He had lumbar laminectomy around 1992 at L3-L4. In [place] he went to [place] and saw Dr. Rica Ortez who did a  laminectomy at L1-L2 L5. He was on [facility] following his last surgery with [place]. He saw Dr. Bigg Ta in [place] roughly 17 years ago who did LESI's. He saw Dr. Tram Rachel after seeing Dr. Bigg Ta for a  brief period. He saw Dr. Angelito Almendarez around 2016 who also did LESI's. He tells me that he left Dr. Angelito Almendarez because he was weaning his  medications too much. Overall he tells me that he has had about 30-40 TAJ's. Denies acupuncture, pain pump implant (IDDS). Pain management currently consists of oxycodone 20mg, he takes 4.5 tabs per day. He denies alcohol consumption; he denies smoking cigarettes, denies illicit drug use. His pain is located in the low back without radiation. He describes his pain as stabbing. Associated symptoms include numbness/tingling. Aggravating factors include standing and walking. Alleviating factors include rest.   The duration of the pain is constant. Function goals include being able to work with less pain.    He rates his pain a 2/10 VAS with pain medication and 8/10 VAS without pain medication. He rates his current pain a 2/10 VAS today. He denies loss of bowel and bladder control. He denies constipation. EMG 2020 BLE   1. mild to moderate peripheral neuropathy probably due to DM. mild chronic/residual left L5 radiculopathy. ROS:   General/Constitutional:   Denies Change in appetite. Denies Chills. Denies Fatigue. Ophthalmologic:   Denies Blurred vision. Denies Discharge. Denies Eye Pain. ENT:   Denies Decreased hearing. Denies Difficulty swallowing. Denies Dry mouth. Endocrine:   Denies Cold intolerance. Denies Dizziness. Denies Excessive sweating. Respiratory:   Denies Chest pain. Denies Cough. Denies Shortness of breath. Denies Wheezing. Cardiovascular:   Denies Chest pain at rest. Denies Chest pain with exertion. Denies Irregular heartbeat. Gastrointestinal:   Denies Abdominal pain. Denies Constipation. Denies Nausea. Denies Vomiting. Genitourinary:   Denies Blood in urine. Denies Difficulty urinating. Denies Painful urination. Musculoskeletal:   Comments See HPI. Skin:   Denies Dry skin. Denies Itching. Denies Rash. Neurologic:   Denies Balance difficulty. Denies Dizziness. Denies Fainting. Psychiatric:   Denies Anxiety. Denies Depressed mood. Denies Eating disorder. Denies Mental or Physical abuse. Denies Nervous breakdown. Denies Substance abuse. Denies Suicidal thoughts. Medical History: Hypertension. Surgical History: right shoulder surgery , neuro stimulater removal .  Hospitalization/Major Diagnostic Procedure: flu . Family History: Father: unknown. Mother: unknown. Paternal [address] Father: unknown. Paternal Grand Mother:  unknown. Maternal Grand Father: unknown. Maternal Grand Mother: unknown. Social History:   Tobacco Use:   Tobacco Use/Smoking   Are you a nonsmoker   Drugs/Alcohol:   Do you smoke marijuana?: Denies.    Do you drink alcohol?: No.  Medications: Taking Multi Adult Gummies , Taking Lisinopril , Taking Amlodipine Besylate , Taking Carvedilol , Taking  Chlorthalidone , Taking Clonidine HCl , Taking Metformin HCl , Taking Flomax , Taking Oxycodone HCl , Notes: 20mg,  Taking Omeprazole , Taking Insul-Cap , Taking Fish Oil , Taking Niacin  Allergies: N.K.D.A. Objective:  Vitals: Pain scale 7 1-10, Ht 5'7in, Wt 314 lbs, Oxygen sat % 94 %, HR 60 /min, /62 mm Hg, BMI 49.17  Index, Ht-cm 170.18, Wt-kg 142.43. Examination:   General Examination:   GENERAL APPEARANCE: No acute or active distress, obese, wearing mask. HEAD: normocephalic, atraumatic. EYES: extraocular movement intact (EOMI) bilaterally, no increased constriction or dilation, conjunctiva are not  injected. EARS: hearing intact. NOSE: nares patent Mask. ORAL CAVITY: Mask. HEART: regular rate and rhythm, without murmur. LUNGS: clear to auscultation bilaterally. ABDOMEN: soft, non-tender, non-distended. PSYCH: alert, oriented, cognitive function intact. pain behavior No exaggerated posture, unnecessary slow movements, excess grimacing or rubbing. SKIN: no injection/needle marks on inspection or palpation. NEUROLOGIC: alert and oriented, no excess drowsiness or inappropriate behavior. .   Lumbar Spine/Lower back:   INSPECTION: lumbar lordosis, surgical scars. PALPATION: bilateral paraspinal tenderness, no sacroliliac joint tenderness, mild L4 L5 vertebral spine tenderness. RANGE OF MOTION: Limited range of motion. Facet exam Negative. SENSORY EXAM: RLE: full sensation above knee at medial aspect; full sensation above knee at lateral aspect; full  sensation below knee at medial aspect; full sensation below knee at lateral aspect; LLE: full sensation above knee at  medial aspect; full sensation above knee at lateral aspect; full sensation below knee at medial aspect; full sensation  below knee at lateral aspect;.    STRENGTH: RLE: 5/5 knee extension, 5/5 knee flexion, 5/5 abduction, 5/5 adduction, 5/5 dorsiflexion, 5/5 plantar  flexion, LLE: 5/5 knee

## 2020-09-29 ENCOUNTER — HOSPITAL ENCOUNTER (OUTPATIENT)
Dept: CT IMAGING | Age: 70
Discharge: HOME | End: 2020-09-29
Payer: MEDICARE

## 2020-09-29 ENCOUNTER — HOSPITAL ENCOUNTER (OUTPATIENT)
Dept: GENERAL RADIOLOGY | Age: 70
Discharge: HOME | End: 2020-09-29
Payer: MEDICARE

## 2020-09-29 VITALS
OXYGEN SATURATION: 94 % | SYSTOLIC BLOOD PRESSURE: 143 MMHG | DIASTOLIC BLOOD PRESSURE: 78 MMHG | TEMPERATURE: 98.7 F | RESPIRATION RATE: 16 BRPM | HEART RATE: 65 BPM

## 2020-09-29 PROCEDURE — 2500000003 HC RX 250 WO HCPCS: Performed by: NEUROLOGICAL SURGERY

## 2020-09-29 PROCEDURE — 72265 MYELOGRAPHY L-S SPINE: CPT

## 2020-09-29 PROCEDURE — 7100000010 HC PHASE II RECOVERY - FIRST 15 MIN

## 2020-09-29 PROCEDURE — 72132 CT LUMBAR SPINE W/DYE: CPT

## 2020-09-29 PROCEDURE — 6360000004 HC RX CONTRAST MEDICATION: Performed by: NEUROLOGICAL SURGERY

## 2020-09-29 PROCEDURE — 7100000011 HC PHASE II RECOVERY - ADDTL 15 MIN

## 2020-09-29 RX ORDER — LIDOCAINE HYDROCHLORIDE 10 MG/ML
5 INJECTION, SOLUTION EPIDURAL; INFILTRATION; INTRACAUDAL; PERINEURAL ONCE
Status: COMPLETED | OUTPATIENT
Start: 2020-09-29 | End: 2020-09-29

## 2020-09-29 RX ADMIN — LIDOCAINE HYDROCHLORIDE 5 ML: 10 INJECTION, SOLUTION EPIDURAL; INFILTRATION; INTRACAUDAL; PERINEURAL at 12:51

## 2020-09-29 RX ADMIN — IOHEXOL 12 ML: 240 INJECTION, SOLUTION INTRATHECAL; INTRAVASCULAR; INTRAVENOUS; ORAL at 12:51

## 2020-09-29 ASSESSMENT — PAIN SCALES - GENERAL: PAINLEVEL_OUTOF10: 0

## 2020-09-29 NOTE — PROGRESS NOTES
Ambulatory Surgery/Procedure Discharge Note    Vitals:    09/29/20 1435   BP: (!) 143/78   Pulse: 65   Resp: 16   Temp:    SpO2: 94%       No intake/output data recorded. Restroom use offered before discharge. Yes    Pain assessment:  level of pain (1-10, 10 severe)  Pt to SDS post lumbar myelogram with CT. Myelogram site clean, dry and intact. Pt denies pain at this time. Pt denies numbness or tingling in lower extremities. Pt declined offer for PO fluids. Discharge instructions given to pt and he states understanding of these instructions. BP meets john discharge criteria. Patient discharged to home/self care.  Patient discharged via wheel chair by transporter to waiting family/S.O.       9/29/2020 3:09 PM

## 2020-10-12 ENCOUNTER — OFFICE VISIT (OUTPATIENT)
Dept: PRIMARY CARE CLINIC | Age: 70
End: 2020-10-12
Payer: MEDICARE

## 2020-10-12 PROCEDURE — G8428 CUR MEDS NOT DOCUMENT: HCPCS | Performed by: NURSE PRACTITIONER

## 2020-10-12 PROCEDURE — G8417 CALC BMI ABV UP PARAM F/U: HCPCS | Performed by: NURSE PRACTITIONER

## 2020-10-12 PROCEDURE — 99211 OFF/OP EST MAY X REQ PHY/QHP: CPT | Performed by: NURSE PRACTITIONER

## 2020-10-14 LAB — SARS-COV-2, NAA: NOT DETECTED

## 2020-10-15 NOTE — RESULT ENCOUNTER NOTE

## 2020-10-15 NOTE — PROGRESS NOTES
be limited to one in the room at any given time. 18.  Please bring picture ID and insurance card. 19.  Visit our web site for additional information:  [email or web address]              20.During flu season no children under the age of 15 are permitted in the hospital for the safety of all patients. 21. If you take a long acting insulin in the evening only  take half of your usual  dose the night  before your procedure              22. If you use a c-pap please bring DOS if staying overnight,             23.For your convenience [facility] has a pharmacy on site to fill your prescriptions. 24. If you use oxygen and have a portable tank please bring it  with you the DOS             25. Bring a complete list of all your medications with name and dose include any supplements. 26. Other__________________________________________   *Please call pre admission testing if you any further questions   [place] [phone]    [phone]  [phone]   [address] [place] [address]       All above information reviewed with patient in person or by phone. Patient verbalizes understanding. All questions and concerns addressed. Patient/Rep________pt____________    There is a one visitor policy at [facility] for all surgeries and endoscopies. Whether the visitor can stay or will be asked to wait in the car will depend on the current policy and if social distancing can be maintained. The policy is subject to change at any time. Please make sure the visitor has a cell phone that is on,charged and able to accept calls, as this may be the way that the staff communicates with them. Pain management is NO VISITOR policyThe patients ride is expected to remain in the car with a cell phone for communication. If the ride is leaving the hospital grounds please make sure they are back in time for pickup. Have the patient inform the staff on arrival what their rides plans are while the patient is in the facility. At the MAIN there is one visitor allowed. Please note that the visitor policy is subject to change.                                                                                                                                     PRE OP INSTRUCTIONS

## 2020-10-16 ENCOUNTER — HOSPITAL ENCOUNTER (OUTPATIENT)
Age: 70
Setting detail: OUTPATIENT SURGERY
Discharge: HOME | End: 2020-10-16
Attending: ANESTHESIOLOGY | Admitting: ANESTHESIOLOGY
Payer: MEDICARE

## 2020-10-16 ENCOUNTER — ANESTHESIA (OUTPATIENT)
Dept: OPERATING ROOM | Age: 70
End: 2020-10-16
Payer: MEDICARE

## 2020-10-16 ENCOUNTER — ANESTHESIA EVENT (OUTPATIENT)
Dept: OPERATING ROOM | Age: 70
End: 2020-10-16
Payer: MEDICARE

## 2020-10-16 ENCOUNTER — APPOINTMENT (OUTPATIENT)
Dept: GENERAL RADIOLOGY | Age: 70
End: 2020-10-16
Attending: ANESTHESIOLOGY
Payer: MEDICARE

## 2020-10-16 VITALS
RESPIRATION RATE: 18 BRPM | OXYGEN SATURATION: 94 % | HEIGHT: 67 IN | HEART RATE: 59 BPM | WEIGHT: 296.4 LBS | TEMPERATURE: 96.7 F | BODY MASS INDEX: 46.52 KG/M2 | SYSTOLIC BLOOD PRESSURE: 126 MMHG | DIASTOLIC BLOOD PRESSURE: 58 MMHG

## 2020-10-16 VITALS
DIASTOLIC BLOOD PRESSURE: 58 MMHG | OXYGEN SATURATION: 85 % | SYSTOLIC BLOOD PRESSURE: 109 MMHG | RESPIRATION RATE: 16 BRPM

## 2020-10-16 LAB
GLUCOSE BLD-MCNC: 128 MG/DL (ref 70–99)
GLUCOSE BLD-MCNC: 156 MG/DL (ref 70–99)
PERFORMED ON: ABNORMAL
PERFORMED ON: ABNORMAL

## 2020-10-16 PROCEDURE — 3600000012 HC SURGERY LEVEL 2 ADDTL 15MIN: Performed by: ANESTHESIOLOGY

## 2020-10-16 PROCEDURE — 2720000010 HC SURG SUPPLY STERILE: Performed by: ANESTHESIOLOGY

## 2020-10-16 PROCEDURE — 6360000002 HC RX W HCPCS: Performed by: ANESTHESIOLOGY

## 2020-10-16 PROCEDURE — 3700000000 HC ANESTHESIA ATTENDED CARE: Performed by: ANESTHESIOLOGY

## 2020-10-16 PROCEDURE — C1894 INTRO/SHEATH, NON-LASER: HCPCS | Performed by: ANESTHESIOLOGY

## 2020-10-16 PROCEDURE — 2500000003 HC RX 250 WO HCPCS: Performed by: NURSE ANESTHETIST, CERTIFIED REGISTERED

## 2020-10-16 PROCEDURE — 3700000001 HC ADD 15 MINUTES (ANESTHESIA): Performed by: ANESTHESIOLOGY

## 2020-10-16 PROCEDURE — 3600000002 HC SURGERY LEVEL 2 BASE: Performed by: ANESTHESIOLOGY

## 2020-10-16 PROCEDURE — 2580000003 HC RX 258: Performed by: ANESTHESIOLOGY

## 2020-10-16 PROCEDURE — 7100000010 HC PHASE II RECOVERY - FIRST 15 MIN: Performed by: ANESTHESIOLOGY

## 2020-10-16 PROCEDURE — 3209999900 FLUORO FOR SURGICAL PROCEDURES

## 2020-10-16 PROCEDURE — 7100000000 HC PACU RECOVERY - FIRST 15 MIN: Performed by: ANESTHESIOLOGY

## 2020-10-16 PROCEDURE — 2500000003 HC RX 250 WO HCPCS: Performed by: ANESTHESIOLOGY

## 2020-10-16 PROCEDURE — 7100000011 HC PHASE II RECOVERY - ADDTL 15 MIN: Performed by: ANESTHESIOLOGY

## 2020-10-16 PROCEDURE — 6360000002 HC RX W HCPCS: Performed by: NURSE ANESTHETIST, CERTIFIED REGISTERED

## 2020-10-16 PROCEDURE — 7100000001 HC PACU RECOVERY - ADDTL 15 MIN: Performed by: ANESTHESIOLOGY

## 2020-10-16 PROCEDURE — 2709999900 HC NON-CHARGEABLE SUPPLY: Performed by: ANESTHESIOLOGY

## 2020-10-16 PROCEDURE — E0783 PROGRAMMABLE INFUSION PUMP: HCPCS | Performed by: ANESTHESIOLOGY

## 2020-10-16 PROCEDURE — C1755 CATHETER, INTRASPINAL: HCPCS | Performed by: ANESTHESIOLOGY

## 2020-10-16 DEVICE — THE PROMETRA II PROGRAMMABLE PUMP IS A STERILE, BATTERY-OPERATED, TEARDROP-SHAPED IMPLANTABLE, PROGRAMMABLE INFUSION PUMP, WITH A RIGID TITANIUM HOUSING AND FLOW CONTROLLER SYSTEM, WHICH DISPENSES INFUSATE INTO THE INTRATHECAL SPACE THROUGH AN IMPLANTED INTRATHECAL CATHETER. TO HELP INCREASE SAFETY, THE PROMETRA II PUMP INCORPORATES A SAFETY VALVE (FLOW-ACTIVATED VALVE OR FAV) THAT WILL SHUT OFF DRUG FLOW TO THE PATIENT IN THE EVENT A HIGH FLOW RATE OCCURS, SUCH AS DURING AN MRI. ALL FUNCTIONS OF THE SYSTEM (E.G., DOSING) ARE CONTROLLED EXTERNALLY USING A HAND-HELD, BATTERY-OPERATED PROGRAMMER. THE PROMETRA II PUMP CONTAINS A METAL BELLOWS DRUG RESERVOIR WITH A CAPACITY OF 20 MILLILITERS (ML). THE RESERVOIR PROPELLANT IS STORED WITHIN THE RIGID HOUSING SURROUNDING THE BELLOWS AND PROVIDES THE DRIVING PRESSURE FOR THE PUMP. THE DRIVING PRESSURE ON THE RESERVOIR FORCES THE INFUSATE THROUGH AN OUTLET FILTER (0.22 PM), AND INTO AN ELECTRONICALLY CONTROLLED FLOW METERING VALVE-ACCUMULATOR SUBSYSTEM. THE INFUMORPH PASSES FROM THE FLOW METERING SUBSYSTEM, INTO THE CATHETER ACCESS PORT THEN INTO THE CATHETER FOR DELIVERY TO THE INTRATHECAL SPACE.
Type: IMPLANTABLE DEVICE | Site: BACK | Status: FUNCTIONAL
Brand: PROMETRA II PUMP

## 2020-10-16 DEVICE — THE INTRATHECAL CATHETER IS A STERILE, SINGLE-PIECE, RADIOPAQUE, SILICONE CATHETER WITH A PRE-INSERTED HYDROPHILIC STIFFENING STYLET THAT IS USED TO ASSIST IN PLACING THE CATHETER.
Type: IMPLANTABLE DEVICE | Site: BACK | Status: FUNCTIONAL
Brand: PROMETRA INTRATHECAL CATHETER

## 2020-10-16 RX ORDER — SUCCINYLCHOLINE/SOD CL,ISO/PF 100 MG/5ML
SYRINGE (ML) INTRAVENOUS PRN
Status: DISCONTINUED | OUTPATIENT
Start: 2020-10-16 | End: 2020-10-16 | Stop reason: SDUPTHER

## 2020-10-16 RX ORDER — SODIUM CHLORIDE 9 MG/ML
INJECTION, SOLUTION INTRAVENOUS CONTINUOUS
Status: DISCONTINUED | OUTPATIENT
Start: 2020-10-16 | End: 2020-10-16 | Stop reason: HOSPADM

## 2020-10-16 RX ORDER — DEXAMETHASONE SODIUM PHOSPHATE 4 MG/ML
INJECTION, SOLUTION INTRA-ARTICULAR; INTRALESIONAL; INTRAMUSCULAR; INTRAVENOUS; SOFT TISSUE PRN
Status: DISCONTINUED | OUTPATIENT
Start: 2020-10-16 | End: 2020-10-16 | Stop reason: SDUPTHER

## 2020-10-16 RX ORDER — LIDOCAINE HYDROCHLORIDE 20 MG/ML
INJECTION, SOLUTION INFILTRATION; PERINEURAL PRN
Status: DISCONTINUED | OUTPATIENT
Start: 2020-10-16 | End: 2020-10-16 | Stop reason: SDUPTHER

## 2020-10-16 RX ORDER — PROPOFOL 10 MG/ML
INJECTION, EMULSION INTRAVENOUS PRN
Status: DISCONTINUED | OUTPATIENT
Start: 2020-10-16 | End: 2020-10-16 | Stop reason: SDUPTHER

## 2020-10-16 RX ORDER — MORPHINE SULFATE 1 MG/ML
INJECTION, SOLUTION EPIDURAL; INTRATHECAL; INTRAVENOUS
Status: COMPLETED | OUTPATIENT
Start: 2020-10-16 | End: 2020-10-16

## 2020-10-16 RX ORDER — BACITRACIN 50000 [USP'U]/1
INJECTION, POWDER, LYOPHILIZED, FOR SOLUTION INTRAMUSCULAR
Status: COMPLETED | OUTPATIENT
Start: 2020-10-16 | End: 2020-10-16

## 2020-10-16 RX ORDER — FENTANYL CITRATE 50 UG/ML
INJECTION, SOLUTION INTRAMUSCULAR; INTRAVENOUS PRN
Status: DISCONTINUED | OUTPATIENT
Start: 2020-10-16 | End: 2020-10-16 | Stop reason: SDUPTHER

## 2020-10-16 RX ORDER — LIDOCAINE HYDROCHLORIDE 10 MG/ML
0.5 INJECTION, SOLUTION EPIDURAL; INFILTRATION; INTRACAUDAL; PERINEURAL ONCE
Status: DISCONTINUED | OUTPATIENT
Start: 2020-10-16 | End: 2020-10-16 | Stop reason: HOSPADM

## 2020-10-16 RX ORDER — ONDANSETRON 2 MG/ML
INJECTION INTRAMUSCULAR; INTRAVENOUS PRN
Status: DISCONTINUED | OUTPATIENT
Start: 2020-10-16 | End: 2020-10-16 | Stop reason: SDUPTHER

## 2020-10-16 RX ADMIN — LIDOCAINE HYDROCHLORIDE 100 MG: 20 INJECTION, SOLUTION INFILTRATION; PERINEURAL at 09:56

## 2020-10-16 RX ADMIN — SODIUM CHLORIDE: 9 INJECTION, SOLUTION INTRAVENOUS at 09:54

## 2020-10-16 RX ADMIN — SODIUM CHLORIDE: 9 INJECTION, SOLUTION INTRAVENOUS at 09:30

## 2020-10-16 RX ADMIN — FENTANYL CITRATE 25 MCG: 50 INJECTION, SOLUTION INTRAMUSCULAR; INTRAVENOUS at 10:40

## 2020-10-16 RX ADMIN — Medication 3 G: at 09:50

## 2020-10-16 RX ADMIN — ONDANSETRON 4 MG: 2 INJECTION INTRAMUSCULAR; INTRAVENOUS at 10:22

## 2020-10-16 RX ADMIN — Medication 140 MG: at 09:56

## 2020-10-16 RX ADMIN — PROPOFOL 200 MG: 10 INJECTION, EMULSION INTRAVENOUS at 09:56

## 2020-10-16 RX ADMIN — FENTANYL CITRATE 50 MCG: 50 INJECTION, SOLUTION INTRAMUSCULAR; INTRAVENOUS at 09:55

## 2020-10-16 RX ADMIN — DEXAMETHASONE SODIUM PHOSPHATE 10 MG: 4 INJECTION, SOLUTION INTRAMUSCULAR; INTRAVENOUS at 10:22

## 2020-10-16 RX ADMIN — PHENYLEPHRINE HYDROCHLORIDE 100 MCG: 10 INJECTION INTRAVENOUS at 10:14

## 2020-10-16 ASSESSMENT — PULMONARY FUNCTION TESTS
PIF_VALUE: 29
PIF_VALUE: 30
PIF_VALUE: 27
PIF_VALUE: 30
PIF_VALUE: 29
PIF_VALUE: 30
PIF_VALUE: 31
PIF_VALUE: 1
PIF_VALUE: 23
PIF_VALUE: 25
PIF_VALUE: 30
PIF_VALUE: 30
PIF_VALUE: 21
PIF_VALUE: 29
PIF_VALUE: 17
PIF_VALUE: 28
PIF_VALUE: 30
PIF_VALUE: 31
PIF_VALUE: 29
PIF_VALUE: 28
PIF_VALUE: 32
PIF_VALUE: 28
PIF_VALUE: 29
PIF_VALUE: 31
PIF_VALUE: 31
PIF_VALUE: 30
PIF_VALUE: 35
PIF_VALUE: 29
PIF_VALUE: 32
PIF_VALUE: 6
PIF_VALUE: 21
PIF_VALUE: 29
PIF_VALUE: 31
PIF_VALUE: 34
PIF_VALUE: 28
PIF_VALUE: 31
PIF_VALUE: 30
PIF_VALUE: 8
PIF_VALUE: 0
PIF_VALUE: 25
PIF_VALUE: 30
PIF_VALUE: 30
PIF_VALUE: 32
PIF_VALUE: 10
PIF_VALUE: 1
PIF_VALUE: 28
PIF_VALUE: 1
PIF_VALUE: 32
PIF_VALUE: 30
PIF_VALUE: 0
PIF_VALUE: 30
PIF_VALUE: 30
PIF_VALUE: 31
PIF_VALUE: 6
PIF_VALUE: 27
PIF_VALUE: 30
PIF_VALUE: 29
PIF_VALUE: 29
PIF_VALUE: 31
PIF_VALUE: 6
PIF_VALUE: 22
PIF_VALUE: 30
PIF_VALUE: 33
PIF_VALUE: 31
PIF_VALUE: 31

## 2020-10-16 ASSESSMENT — PAIN DESCRIPTION - DESCRIPTORS: DESCRIPTORS: ACHING;SHOOTING

## 2020-10-16 ASSESSMENT — PAIN - FUNCTIONAL ASSESSMENT: PAIN_FUNCTIONAL_ASSESSMENT: 0-10

## 2020-10-16 NOTE — ANESTHESIA PRE PROCEDURE
Department of Anesthesiology  Preprocedure Note       Name:  Zafar Cha   Age:  78 y.o.  :  1950                                          MRN:  [id number]         Date:  10/16/2020      Surgeon: Bernadette Franks):  Grecia Castillo MD    Procedure: Procedure(s):  INTRATHECAL DRUG DELIVERY SYSTEM IMPLANT - FLOWONIX    Medications prior to admission:   Prior to Admission medications    Medication Sig Start Date End Date Taking?  Authorizing Provider   atorvastatin (LIPITOR) 20 MG tablet Take 20 mg by mouth daily    Historical Provider, MD   albuterol sulfate HFA (PROAIR HFA) 108 (90 Base) MCG/ACT inhaler Inhale 2 puffs into the lungs 10/11/18   Historical Provider, MD   amLODIPine (NORVASC) 5 MG tablet Take 10 mg by mouth daily  18   Historical Provider, MD   aspirin 81 MG EC tablet Take 81 mg by mouth daily    Historical Provider, MD   carvedilol (COREG) 12.5 MG tablet Take 25 mg by mouth 2 times daily  18   Historical Provider, MD   vitamin D (CHOLECALCIFEROL) 82124 units CAPS capsule Take 10,000 Units by mouth 2 times daily    Historical Provider, MD   desvenlafaxine succinate (PRISTIQ) 100 MG TB24 extended release tablet Take 100 mg by mouth 2 times daily 12/10/18   Historical Provider, MD   ibuprofen (IBU) 800 MG tablet Take 800 mg by mouth three times daily 18   Historical Provider, MD   insulin NPH (HUMULIN N) 100 UNIT/ML injection vial Inject 60 Units into the skin 2 times daily 3/6/18   Historical Provider, MD   lisinopril (PRINIVIL;ZESTRIL) 20 MG tablet Take 20 mg by mouth 2 times daily 18   Historical Provider, MD   metFORMIN (GLUCOPHAGE) 1000 MG tablet Take 1,000 mg by mouth 2 times daily (with meals) 18   Historical Provider, MD   Multiple Vitamin (MULTI-DAY VITAMINS PO) Take 1 tablet by mouth 2 times daily    Historical Provider, MD   Omega-3 Fatty Acids (FISH OIL) 1200 MG CAPS Take 1 capsule by mouth 2 times daily    Historical Provider, MD   oxyCODONE given: Not Answered      Vital Signs (Current):   Vitals:    10/15/20 0912   Weight: 289 lb (131.1 kg)   Height: 5' 7\" (1.702 m)                                              BP Readings from Last 3 Encounters:   09/29/20 (!) 143/78   09/15/20 (!) 128/34   09/02/20 138/63       NPO Status:                                                                                 BMI:   Wt Readings from Last 3 Encounters:   10/15/20 289 lb (131.1 kg)   09/15/20 (!) 305 lb (138.3 kg)   01/30/19 (!) 320 lb (145.2 kg)     Body mass index is 45.26 kg/m². CBC:   Lab Results   Component Value Date    WBC 10.6 09/02/2020    RBC 4.99 09/02/2020    HGB 16.4 09/02/2020    HCT 48.3 09/02/2020    MCV 96.8 09/02/2020    RDW 15.1 09/02/2020     09/02/2020       CMP:   Lab Results   Component Value Date     09/02/2020    K 4.4 09/02/2020    CL 99 09/02/2020    CO2 26 09/02/2020    BUN 27 09/02/2020    CREATININE 1.3 09/02/2020    GFRAA >60 09/02/2020    LABGLOM 55 09/02/2020    GLUCOSE 135 09/02/2020    CALCIUM 9.9 09/02/2020       POC Tests: No results for input(s): POCGLU, POCNA, POCK, POCCL, POCBUN, POCHEMO, POCHCT in the last 72 hours.     Coags: No results found for: PROTIME, INR, APTT    HCG (If Applicable): No results found for: PREGTESTUR, PREGSERUM, HCG, HCGQUANT     ABGs: No results found for: PHART, PO2ART, NDN4ARM, OZF3EYR, BEART, K6WKFPZP     Type & Screen (If Applicable):  No results found for: LABABO, LABRH    Drug/Infectious Status (If Applicable):  No results found for: HIV, HEPCAB    COVID-19 Screening (If Applicable):   Lab Results   Component Value Date    COVID19 NOT DETECTED 10/12/2020         Anesthesia Evaluation    Airway: Mallampati: II  TM distance: >3 FB   Neck ROM: full  Mouth opening: > = 3 FB Dental:          Pulmonary:   (+) sleep apnea:                             Cardiovascular:    (+) hypertension:,         Rhythm: regular  Rate: normal                    Neuro/Psych: GI/Hepatic/Renal:             Endo/Other:    (+) Diabetes, . Abdominal:           Vascular:                                        Anesthesia Plan      MAC     ASA 3       Induction: intravenous. Anesthetic plan and risks discussed with patient. Plan discussed with CRNA.                   Marilyn Joyce MD   10/16/2020

## 2020-10-16 NOTE — PROGRESS NOTES
Pt arrived from OR to PACU bay 4. Reported received from [address] [place] [address] staff. Pt arouses to voice. Surgical incisions dressings in place to R flank. Pt on 2.5L NC, SB , and VSS.  Will continue to monitor.'

## 2020-10-16 NOTE — H&P
[facility]   [facility]ISTORY AND PHYSICAL      I am seeing Ran Bender at the request of Dr Michaela Merino for evaluation of the patient's medical problems prior to Intrathecal drug delivery system implant Flowonix     10/16/2020 8:20 AM    Patient Information:  Ran Bender is a 78 y.o. male [id number]  PCP:  dEith [place] MD (Tel: [phone] )    Chief complaint:  I am getting a pain pump     History of Present Illness:  Brooke Schroeder is a 78 y.o. male who presented with chronic back pain. Symptom onset was gradual for a time period of several years. The severity is described as moderate to severe. The course of his symptoms over time is recurrent. The symptoms improved with rest and worsened with activity. The patient's symptom is associated with post laminectomy syndrome. History obtained from patient and record review   Pt reports recent intentional weight loss of 35 lbs  No longer taking insulin with recent weight loss   No issues with previous anesthesia     REVIEW OF SYSTEMS:   Constitutional:  Negative for fever,chills or night sweats  ENT:  Negative for rhinorrhea, epistaxis, hoarseness, sore throat. Respiratory:   Negative for shortness of breath,wheezing  Cardiovascular:   Negative for  chest pain, palpitations   Gastrointestinal:  Negative for nausea, vomiting, diarrhea  Genitourinary:  Negative for polyuria, dysuria   Hematologic/Lymphatic:  Negative for  bleeding tendency, easy bruising  Musculoskeletal:  Negative for myalgias and arthralgias, + chronic back and leg pain   Neurologic:  Negative for  confusion,dysarthria. Skin:  Negative for itching,rash  Psychiatric:  Negative for depression,anxiety, agitation. Endocrine:  Negative for polydipsia,polyuria,heat /cold intolerance. Past Medical History:   has a past medical history of Diabetes mellitus ([facility] [address] Hypertension, and Sleep apnea. Past Surgical History:   has a past surgical history that includes shoulder surgery (Right); Spinal Cord Stimulator Surgery; back surgery; eye surgery; and Pain management procedure (N/A, 9/15/2020). Medications:  No current facility-administered medications on file prior to encounter. Current Outpatient Medications on File Prior to Encounter   Medication Sig Dispense Refill    atorvastatin (LIPITOR) 20 MG tablet Take 20 mg by mouth daily      albuterol sulfate HFA (PROAIR HFA) 108 (90 Base) MCG/ACT inhaler Inhale 2 puffs into the lungs      amLODIPine (NORVASC) 5 MG tablet Take 10 mg by mouth daily       aspirin 81 MG EC tablet Take 81 mg by mouth daily      carvedilol (COREG) 12.5 MG tablet Take 25 mg by mouth 2 times daily       vitamin D (CHOLECALCIFEROL) 22754 units CAPS capsule Take 10,000 Units by mouth 2 times daily      desvenlafaxine succinate (PRISTIQ) 100 MG TB24 extended release tablet Take 100 mg by mouth 2 times daily      ibuprofen (IBU) 800 MG tablet Take 800 mg by mouth three times daily      insulin NPH (HUMULIN N) 100 UNIT/ML injection vial Inject 60 Units into the skin 2 times daily      lisinopril (PRINIVIL;ZESTRIL) 20 MG tablet Take 20 mg by mouth 2 times daily      metFORMIN (GLUCOPHAGE) 1000 MG tablet Take 1,000 mg by mouth 2 times daily (with meals)      Multiple Vitamin (MULTI-DAY VITAMINS PO) Take 1 tablet by mouth 2 times daily      Omega-3 Fatty Acids (FISH OIL) 1200 MG CAPS Take 1 capsule by mouth 2 times daily      oxyCODONE (ROXICODONE) 20 MG immediate release tablet Take 15 mg by mouth every 6 hours as needed for Pain.        promethazine (PHENERGAN) 25 MG tablet Take 25 mg by mouth every 4-6 hours as needed for Nausea      tadalafil (CIALIS) 20 MG tablet Take 20 mg by mouth as needed      tamsulosin (FLOMAX) 0.4 MG capsule Take 0.4 mg by mouth 2 times daily      testosterone cypionate (DEPOTESTOTERONE CYPIONATE) 200 MG/ML injection Inject 100 mg into the muscle once a week. Allergies: Allergies   Allergen Reactions    Cyclobenzaprine Other (See Comments)     agitated   States makes 'him evil'. Social History:   reports that he has never smoked. He has never used smokeless tobacco. He reports current alcohol use. He reports previous drug use. Drug: Marijuana. Family History:  Pt was adopted. Family history is unknown     Physical Exam:  BP (!) 144/75   Pulse 63   Temp 97.1 °F (36.2 °C) (Temporal)   Resp 20   Ht 5' 7\" (1.702 m)   Wt 296 lb 6.4 oz (134.4 kg)   SpO2 94%   BMI 46.42 kg/m²     General appearance:  Appears comfortable. Well nourished  Eyes: Sclera clear, pupils equal  ENT: Moist mucus membranes, no thrush. Trachea midline. Cardiovascular: Regular rhythm, normal S1, S2. No murmur, gallop, rub. No edema in lower extremities  Respiratory: Clear to auscultation bilaterally, no wheeze, good inspiratory effort  Gastrointestinal: Abdomen soft, non-tender, not distended, normal bowel sounds  Musculoskeletal: No cyanosis in digits, neck supple  Neurology: Cranial nerves grossly intact. Alert and oriented in time, place and person. No speech or motor deficits  Psychiatry: Appropriate affect. Not agitated  Skin: Warm, dry, normal turgor, no rash    Labs:  CBC:   Lab Results   Component Value Date    WBC 10.6 09/02/2020    RBC 4.99 09/02/2020    HGB 16.4 09/02/2020    HCT 48.3 09/02/2020    MCV 96.8 09/02/2020    MCH 32.9 09/02/2020    MCHC 34.0 09/02/2020    RDW 15.1 09/02/2020     09/02/2020    MPV 7.8 09/02/2020     BMP:    Lab Results   Component Value Date     09/02/2020    K 4.4 09/02/2020    CL 99 09/02/2020    CO2 26 09/02/2020    BUN 27 09/02/2020    CREATININE 1.3 09/02/2020    CALCIUM 9.9 09/02/2020    GFRAA >60 09/02/2020    LABGLOM 55 09/02/2020    GLUCOSE 135 09/02/2020           Problem List  Chronic pain syndrome  HTN  T2DM      Assessment & Recommendation:     1.  Chronic pain syndrome/ post laminectomy syndrome: For pain pump insertion today  2. T2DM:  BG this am is 128 AIC was 7.3 in September   Pt no longer requires insulin   3. KESHAV:  On CPAP at hs  4. HTN:  BP in range on current therapy   5. GERD:  No current symptoms on PPI therapy      Patient is medically optimized for surgery. Thank you for the opportunity to participate in the care of your patient.   Hernando Bolden, TREVOR - CNP    10/16/2020 8:20 AM

## 2020-10-16 NOTE — PROGRESS NOTES
Pt awake and alert. Pt on RA, VSS. Friend/ride on way. Pt denies pain and nausea, tolerating PO. Pt meets criteria to be discharged from Phase 1.

## 2020-10-16 NOTE — ANESTHESIA POSTPROCEDURE EVALUATION
Department of Anesthesiology  Postprocedure Note    Patient: Severa Peace  MRN: [id number]  YOB: 1950  Date of evaluation: 10/16/2020  Time:  1:23 PM     Procedure Summary     Date:  10/16/20 Room / Location:  [address]    Anesthesia Start:  0567 Anesthesia Stop:  1106    Procedure:  INTRATHECAL DRUG [facility] (N/A ) Diagnosis:  (M96.1  POST LAMINECTOMY SYNDROME)    Surgeon:  Fran Lovett MD Responsible Provider:  Zafar Herron MD    Anesthesia Type:  MAC ASA Status:  3          Anesthesia Type: MAC    Rolando Phase I: Rolando Score: 10    Rolando Phase II: Rolando Score: 10    Last vitals: Reviewed and per EMR flowsheets.        Anesthesia Post Evaluation    Level of consciousness: awake  Complications: no

## 2020-10-16 NOTE — PROGRESS NOTES
Teaching / education initiated regarding perioperative experience, expectations, and pain management during stay. Patient verbalized understanding.  Moon Mast RN

## 2020-10-16 NOTE — OP NOTE
Operative Note      Patient: Brooke Schroeder  YOB: 1950  MRN: [id number]    Date of Procedure: 10/16/2020    Pre-Op Diagnosis: M96.1  POST LAMINECTOMY SYNDROME    Post-Op Diagnosis: Same       Procedure(s):  INTRATHECAL DRUG DELIVERY SYSTEM IMPLANT - [facility] [facility]    Surgeon(s):  Camacho Arroyo MD    Assistant:   First Assistant: Ira Metz    Anesthesia: Monitor Anesthesia Care    Estimated Blood Loss (mL): Minimal    Complications: None    Specimens:   * No specimens in log *    Implants:  Implant Name Type Inv. Item Serial No.  Lot No. LRB No. Used Action   CATH INTRATHECAL PROMETRA Spine CATH INTRATHECAL [facility] [address] N/A 1 Implanted   FIXATE SUTURING DEVICE     [id number] N/A 1 Implanted   IMPL PUMP PAIN PROGRAM PROMETRA II - [id number] Spine IMPL PUMP PAIN PROGRAM PROMETRA II [id number] [employer]  N/A 1 Implanted         Drains: * No LDAs found *    Findings:     Detailed Description of Procedure:   PROCEDURE(S) PERFORMED:   1. Insertion of intrathecal Catheter under Fluoroscopy Guidance  2. Insertion of a Prometra II Intrathecal Pump    3. Programming the pump at 0.1 mg/day PF Morphine 1 mg/ml. HISTORY OF PRESENT ILLNESS: The patient has a history of sever chronic pain, results in uncontrolled pain after she failed multiple modalities including oral therapy. The patient had more than 70% relief after the IDDS trial single shot injection, and she would like to proceed with the permanent implant     The patient understands the reason for the procedure along with the risks, benefits and alternatives available. The patient has had the opportunity to ask questions prior to the procedure, and the patient has given written, informed, consent to proceed with the procedure.     The Patient presents today for the permanent implant, knowing that the placement may be challenging and there is a great possibility that will not be able to duplicate the first placement. Patient Consent:      The patient is an established patient with the above known diagnosis. The patient understands the reason for the procedure along with the risks, benefits and alternatives available. The patient has had the opportunity to ask questions prior to the procedure, and the patient has given written, informed, consent to proceed with the procedure. The patient is fully aware and understands that this procedure may be complicated by Spinal Cord Injury, infection, bleeding, paralysis, death, and other complication including and limited to a device failure. The patient verbalized understanding and signed his consent. Medical Necessity: This patient has chronic pain that has failed to respond to conservative measures as outlined in the original history and physical exam on the patients symptoms include low back pain and disability of a moderate to severe degree with intermittent leg pain. The goals of treatment are to:    1) Achieve optimal pain control, recognizing that a pain-free state may not be achievable;  2) Minimize adverse outcomes;   3) Enhance functional abilities, and physical and psychological well-being. 4) Enhance the quality of life for patients with chronic pain. DETAILS OF PROCEDURE: After obtaining a written informed consent, an IV was started and the patient was taken back to the operating room. GA was introduced to the patient and an ETT was placed uneventfully. The patient was placed in a prone position, the surgical sites were cleaned using strict Aseptic technique and draped in sterile fashion. All prominent bony structures were padded. The entry site for the intrathecal catheter was located at the L3-L4 intervertebral space.  The entry site at the back, was localized using a combination of 1% lidocaine and 0.25% bupivacaine, a total of 5 ml was used, then a 14 G 15 inch, needle was introduced through the skin, I was able to gain access to the intrathecal space, there was negative heme, positive clear CSF, the catheter was placed in the needle and directed under fluoroscopy all the way to T7 level. A 1/2 inch incision was done along the needle and dissected to the fascia, all bleeding points cauterized , the inject anchor was place over the catheter after pulling out the needle and the inner stilette an inject anchor was placed, using the Fixate device I fixed the anchor over the supraspinous ligament. Then we made a 2 inch incision in the Right Flank to create the pocket for the Pump, the pocket was created in a blunt dissection, all bleeding points were cauterized, then we introduced a tunneler to pass the catheter to the pocket, connected the catheter to the pump and then anchored the pump to the fascia using Ethibond 0 sutures. Both incision were copiously irrigated with NS mixed with Bacitracin,      The wound was closed using 3-0 Vicryl and the skin was closed using 4-0 monocryl. After closing both wounds, clean dry dressing was applied to the skin and the pump was not filled with a morphine solution, continued to be on sterile water minimal rate. The patient tolerated the procedure well and there were no known complications. The patient was sent to the PACU for recover and observation. The patient pump will be filled at the office.     Electronically signed by Mateo Guaman MD on 10/16/2020 at 1:12 PM

## 2020-12-15 ENCOUNTER — HOSPITAL ENCOUNTER (OUTPATIENT)
Dept: GENERAL RADIOLOGY | Age: 70
Discharge: HOME | End: 2020-12-15
Payer: MEDICARE

## 2020-12-15 ENCOUNTER — HOSPITAL ENCOUNTER (OUTPATIENT)
Dept: CT IMAGING | Age: 70
Discharge: HOME | End: 2020-12-15
Payer: MEDICARE

## 2020-12-15 VITALS
SYSTOLIC BLOOD PRESSURE: 174 MMHG | DIASTOLIC BLOOD PRESSURE: 66 MMHG | RESPIRATION RATE: 16 BRPM | OXYGEN SATURATION: 96 % | HEART RATE: 56 BPM | TEMPERATURE: 98.3 F

## 2020-12-15 PROCEDURE — 6360000004 HC RX CONTRAST MEDICATION: Performed by: NEUROLOGICAL SURGERY

## 2020-12-15 PROCEDURE — 7100000011 HC PHASE II RECOVERY - ADDTL 15 MIN

## 2020-12-15 PROCEDURE — 7100000010 HC PHASE II RECOVERY - FIRST 15 MIN

## 2020-12-15 PROCEDURE — 72132 CT LUMBAR SPINE W/DYE: CPT

## 2020-12-15 PROCEDURE — 72265 MYELOGRAPHY L-S SPINE: CPT

## 2020-12-15 RX ORDER — OXYCODONE HYDROCHLORIDE AND ACETAMINOPHEN 5; 325 MG/1; MG/1
1 TABLET ORAL EVERY 4 HOURS PRN
Status: DISCONTINUED | OUTPATIENT
Start: 2020-12-15 | End: 2020-12-16 | Stop reason: HOSPADM

## 2020-12-15 RX ORDER — PROMETHAZINE HYDROCHLORIDE 25 MG/ML
12.5 INJECTION, SOLUTION INTRAMUSCULAR; INTRAVENOUS EVERY 4 HOURS PRN
Status: DISCONTINUED | OUTPATIENT
Start: 2020-12-15 | End: 2020-12-16 | Stop reason: HOSPADM

## 2020-12-15 RX ADMIN — IOHEXOL 12 ML: 240 INJECTION, SOLUTION INTRATHECAL; INTRAVASCULAR; INTRAVENOUS; ORAL at 11:53

## 2020-12-15 NOTE — PROGRESS NOTES
RN arrived in pt room, pt out of bed sitting in chair and stated someone downstairs in radiology told him he could leave at 1300. Spoke with Chele Iyer, she confirmed with Dr. Rosey Cordoba that pt could leave at 1300. Pt then stated he needed to leave by 1230, he had a doctors appt and that he was going to leave regardless of what the doctor said. Spoke with Miguel Stark RN to update. Upon returning to pt room, the pt had left. Pts myelogram site was unable to be assessed. Discharge instructions printed but unable to review with pt or give copy. Miguel Stark RN notified pt left hospital with no assessment or discharge instructions.

## 2021-05-02 ENCOUNTER — APPOINTMENT (OUTPATIENT)
Dept: GENERAL RADIOLOGY | Age: 71
End: 2021-05-02
Payer: MEDICARE

## 2021-05-02 ENCOUNTER — HOSPITAL ENCOUNTER (EMERGENCY)
Age: 71
Discharge: HOME | End: 2021-05-03
Attending: EMERGENCY MEDICINE
Payer: MEDICARE

## 2021-05-02 DIAGNOSIS — R07.9 CHEST PAIN, UNSPECIFIED TYPE: Primary | ICD-10-CM

## 2021-05-02 PROCEDURE — 93005 ELECTROCARDIOGRAM TRACING: CPT | Performed by: STUDENT IN AN ORGANIZED HEALTH CARE EDUCATION/TRAINING PROGRAM

## 2021-05-02 PROCEDURE — 99285 EMERGENCY DEPT VISIT HI MDM: CPT

## 2021-05-02 PROCEDURE — 71046 X-RAY EXAM CHEST 2 VIEWS: CPT

## 2021-05-03 VITALS
OXYGEN SATURATION: 98 % | HEIGHT: 67 IN | TEMPERATURE: 98.5 F | WEIGHT: 270 LBS | HEART RATE: 57 BPM | SYSTOLIC BLOOD PRESSURE: 107 MMHG | DIASTOLIC BLOOD PRESSURE: 51 MMHG | BODY MASS INDEX: 42.38 KG/M2 | RESPIRATION RATE: 19 BRPM

## 2021-05-03 LAB
ANION GAP SERPL CALCULATED.3IONS-SCNC: 12 MMOL/L (ref 3–16)
BASOPHILS ABSOLUTE: 0 K/UL (ref 0–0.2)
BASOPHILS RELATIVE PERCENT: 0.1 %
BUN BLDV-MCNC: 25 MG/DL (ref 7–20)
CALCIUM SERPL-MCNC: 10.1 MG/DL (ref 8.3–10.6)
CHLORIDE BLD-SCNC: 101 MMOL/L (ref 99–110)
CO2: 25 MMOL/L (ref 21–32)
CREAT SERPL-MCNC: 1.3 MG/DL (ref 0.8–1.3)
EKG ATRIAL RATE: 61 BPM
EKG DIAGNOSIS: NORMAL
EKG P AXIS: 34 DEGREES
EKG P-R INTERVAL: 190 MS
EKG Q-T INTERVAL: 374 MS
EKG QRS DURATION: 112 MS
EKG QTC CALCULATION (BAZETT): 376 MS
EKG R AXIS: 14 DEGREES
EKG T AXIS: 58 DEGREES
EKG VENTRICULAR RATE: 61 BPM
EOSINOPHILS ABSOLUTE: 0.2 K/UL (ref 0–0.6)
EOSINOPHILS RELATIVE PERCENT: 1.1 %
GFR AFRICAN AMERICAN: >60
GFR NON-AFRICAN AMERICAN: 55
GLUCOSE BLD-MCNC: 156 MG/DL (ref 70–99)
HCT VFR BLD CALC: 42.1 % (ref 40.5–52.5)
HEMOGLOBIN: 14.1 G/DL (ref 13.5–17.5)
LYMPHOCYTES ABSOLUTE: 1.3 K/UL (ref 1–5.1)
LYMPHOCYTES RELATIVE PERCENT: 8.8 %
MCH RBC QN AUTO: 32.6 PG (ref 26–34)
MCHC RBC AUTO-ENTMCNC: 33.5 G/DL (ref 31–36)
MCV RBC AUTO: 97.6 FL (ref 80–100)
MONOCYTES ABSOLUTE: 0.8 K/UL (ref 0–1.3)
MONOCYTES RELATIVE PERCENT: 5.6 %
NEUTROPHILS ABSOLUTE: 12.3 K/UL (ref 1.7–7.7)
NEUTROPHILS RELATIVE PERCENT: 84.4 %
PDW BLD-RTO: 13.2 % (ref 12.4–15.4)
PLATELET # BLD: 216 K/UL (ref 135–450)
PMV BLD AUTO: 8.3 FL (ref 5–10.5)
POTASSIUM REFLEX MAGNESIUM: 4 MMOL/L (ref 3.5–5.1)
RBC # BLD: 4.31 M/UL (ref 4.2–5.9)
SODIUM BLD-SCNC: 138 MMOL/L (ref 136–145)
TROPONIN: <0.01 NG/ML
TROPONIN: <0.01 NG/ML
WBC # BLD: 14.6 K/UL (ref 4–11)

## 2021-05-03 PROCEDURE — 85025 COMPLETE CBC W/AUTO DIFF WBC: CPT

## 2021-05-03 PROCEDURE — 36415 COLL VENOUS BLD VENIPUNCTURE: CPT

## 2021-05-03 PROCEDURE — 80048 BASIC METABOLIC PNL TOTAL CA: CPT

## 2021-05-03 PROCEDURE — 84484 ASSAY OF TROPONIN QUANT: CPT

## 2021-05-03 ASSESSMENT — ENCOUNTER SYMPTOMS
COUGH: 0
SHORTNESS OF BREATH: 0
NAUSEA: 1
BACK PAIN: 0
VOMITING: 0
EYE PAIN: 0
SORE THROAT: 0
ABDOMINAL PAIN: 0

## 2021-05-03 NOTE — ED PROVIDER NOTES
[id number] [facility] RESIDENT NOTE       Date of evaluation: 5/2/2021    Chief Complaint     Chest Pain      History of Present Illness     Kelton Echevarria is a 73 y.o. male who presents with chest pain. Approximately 2 hours prior to arrival, patient had an episode of chest pain that lasted 1 hour. He states that it felt like indigestion. He took some \"papaya pills\" but did not immediately resolve his symptoms like they usually do. Eventually, his pain did go away after 1 hour without other intervention. He had some mild nausea without any significant shortness of breath. He did not vomit. He denies abdominal pain. He was not diaphoretic. He has had mild pitting edema bilaterally for the past multiple months, no asymmetry of his legs. No recent travel or surgeries. No history of blood clots. He currently has no pain at all. Review of Systems     Review of Systems   Constitutional: Negative for chills and fever. HENT: Negative for congestion and sore throat. Eyes: Negative for pain. Respiratory: Negative for cough and shortness of breath. Cardiovascular: Positive for chest pain. Gastrointestinal: Positive for nausea. Negative for abdominal pain and vomiting. Genitourinary: Negative for flank pain. Musculoskeletal: Negative for back pain. Skin: Negative for rash. Neurological: Negative for headaches. Past Medical, Surgical, Family, and Social History     He has a past medical history of Diabetes mellitus ([address] H/O cardiovascular stress test, Hypertension, and Sleep apnea. He has a past surgical history that includes shoulder surgery (Right); Spinal Cord Stimulator Surgery; back surgery; eye surgery; Pain management procedure (N/A, 9/15/2020); and pr insert subcut reserv/pump/dev (N/A, 10/16/2020). His family history is not on file. He reports that he has never smoked.  He has never used smokeless tobacco. He reports current alcohol use. He reports previous drug use. Drug: Marijuana. Medications     Previous Medications    AMLODIPINE (NORVASC) 5 MG TABLET    Take 10 mg by mouth daily     ASPIRIN 81 MG EC TABLET    Take 81 mg by mouth daily    ATORVASTATIN (LIPITOR) 20 MG TABLET    Take 20 mg by mouth daily    CARVEDILOL (COREG) 12.5 MG TABLET    Take 25 mg by mouth 2 times daily     DESVENLAFAXINE SUCCINATE (PRISTIQ) 100 MG TB24 EXTENDED RELEASE TABLET    Take 100 mg by mouth 2 times daily    IBUPROFEN (IBU) 800 MG TABLET    Take 800 mg by mouth three times daily    INSULIN NPH (HUMULIN N) 100 UNIT/ML INJECTION VIAL    Inject 60 Units into the skin 2 times daily    LISINOPRIL (PRINIVIL;ZESTRIL) 20 MG TABLET    Take 20 mg by mouth 2 times daily    METFORMIN (GLUCOPHAGE) 1000 MG TABLET    Take 1,000 mg by mouth 2 times daily (with meals)    MULTIPLE VITAMIN (MULTI-DAY VITAMINS PO)    Take 1 tablet by mouth 2 times daily    OMEGA-3 FATTY ACIDS (FISH OIL) 1200 MG CAPS    Take 1 capsule by mouth 2 times daily    OXYCODONE (ROXICODONE) 20 MG IMMEDIATE RELEASE TABLET    Take 15 mg by mouth every 6 hours as needed for Pain. PROMETHAZINE (PHENERGAN) 25 MG TABLET    Take 25 mg by mouth every 4-6 hours as needed for Nausea    TADALAFIL (CIALIS) 20 MG TABLET    Take 20 mg by mouth as needed    TAMSULOSIN (FLOMAX) 0.4 MG CAPSULE    Take 0.4 mg by mouth 2 times daily    TESTOSTERONE CYPIONATE (DEPOTESTOTERONE CYPIONATE) 200 MG/ML INJECTION    Inject 100 mg into the muscle once a week. VITAMIN D (CHOLECALCIFEROL) 14934 UNITS CAPS CAPSULE    Take 10,000 Units by mouth 2 times daily       Allergies     He is allergic to cyclobenzaprine. Physical Exam     INITIAL VITALS: BP: (!) 141/50, Temp: 98.5 °F (36.9 °C), Pulse: 65, Resp: 18, SpO2: 91 %   Physical Exam  Constitutional:       General: He is not in acute distress. Appearance: He is well-developed. He is not diaphoretic. HENT:      Head: Normocephalic and atraumatic.    Eyes: Pupils: Pupils are equal, round, and reactive to light. Neck:      Musculoskeletal: Neck supple. Cardiovascular:      Rate and Rhythm: Normal rate and regular rhythm. Heart sounds: Normal heart sounds. Pulmonary:      Effort: Pulmonary effort is normal. No respiratory distress. Breath sounds: Normal breath sounds. No stridor. No wheezing or rales. Abdominal:      General: There is no distension. Palpations: Abdomen is soft. Tenderness: There is no abdominal tenderness. There is no guarding or rebound. Musculoskeletal:      Right lower leg: Edema present. Left lower leg: Edema present. Comments: Mild equal pitting edema   Skin:     General: Skin is warm and dry. Findings: No rash. Neurological:      Mental Status: He is alert and oriented to person, place, and time. DiagnosticResults     EKG   Interpreted in conjunction with emergencydepartment physician No att. providers found  Rhythm: normal sinus   Rate: normal  Axis: normal  Ectopy: none  Conduction: normal  ST Segments: no acute change and normal  T Waves:no acute change  Q Waves: none  Clinical Impression: no acute changes  Comparison:  Sep 2020, no pvcs on todays    RADIOLOGY:  XR CHEST (2 VW)   Final Result     Bibasilar opacities which may represent atelectasis and scarring. An    infectious process is not excluded.               LABS:   Results for orders placed or performed during the hospital encounter of 05/02/21   CBC Auto Differential   Result Value Ref Range    WBC 14.6 (H) 4.0 - 11.0 K/uL    RBC 4.31 4.20 - 5.90 M/uL    Hemoglobin 14.1 13.5 - 17.5 g/dL    Hematocrit 42.1 40.5 - 52.5 %    MCV 97.6 80.0 - 100.0 fL    MCH 32.6 26.0 - 34.0 pg    MCHC 33.5 31.0 - 36.0 g/dL    RDW 13.2 12.4 - 15.4 %    Platelets 189 763 - 535 K/uL    MPV 8.3 5.0 - 10.5 fL    Neutrophils % 84.4 %    Lymphocytes % 8.8 %    Monocytes % 5.6 %    Eosinophils % 1.1 %    Basophils % 0.1 %    Neutrophils Absolute 12.3 (H) 1.7 - 7.7 K/uL    Lymphocytes Absolute 1.3 1.0 - 5.1 K/uL    Monocytes Absolute 0.8 0.0 - 1.3 K/uL    Eosinophils Absolute 0.2 0.0 - 0.6 K/uL    Basophils Absolute 0.0 0.0 - 0.2 K/uL   Troponin   Result Value Ref Range    Troponin <0.01 <0.01 ng/mL   Troponin (lab)   Result Value Ref Range    Troponin <0.01 <0.01 ng/mL   Basic Metabolic Panel w/ Reflex to MG   Result Value Ref Range    Sodium 138 136 - 145 mmol/L    Potassium reflex Magnesium 4.0 3.5 - 5.1 mmol/L    Chloride 101 99 - 110 mmol/L    CO2 25 21 - 32 mmol/L    Anion Gap 12 3 - 16    Glucose 156 (H) 70 - 99 mg/dL    BUN 25 (H) 7 - 20 mg/dL    CREATININE 1.3 0.8 - 1.3 mg/dL    GFR Non-African American 55 (A) >60    GFR African American >60 >60    Calcium 10.1 8.3 - 10.6 mg/dL       ED BEDSIDE ULTRASOUND:  na    RECENT VITALS:  BP: (!) 110/55, Temp: 98.5 °F (36.9 °C), Pulse: 59,Resp: 18, SpO2: 97 %     Procedures     na    ED Course     Nursing Notes, Past Medical Hx, Past Surgical Hx, Social Hx, Allergies, and Family Hx were reviewed. The patient was given the followingmedications:  No orders of the defined types were placed in this encounter. CONSULTS:  None    MEDICAL DECISION MAKING / ASSESSMENT / Bob Horace is a 73 y.o. male with atypical chest pain at home that felt like indigestion but did not improve with his usual treatment. He did not have any significant associated symptoms and it was not exertional in nature. Overall, less suspicious for ACS. He had 2 neg troponins and a reassuring EKG. He had a mild leukocytosis but no fever. He had no productive cough. Chest x-ray on my read did not show any clear consolidations. He is vaccinated for Covid. Possibly epigastric/GI in nature. Low suspicion for PE. Stable for discharge to follow-up with his PCP and cardiologist. No pain since in the ED. This patient was also evaluated by the attending physician. All care plans werediscussed and agreed upon.     Clinical

## 2021-05-03 NOTE — ED PROVIDER NOTES
ED Attending Attestation Note     Date of evaluation: 5/2/2021    This patient was seen by the resident. I have seen and examined the patient, agree with the workup, evaluation, management and diagnosis. The care plan has been discussed. I have reviewed the ECG and concur with the resident's interpretation. My assessment reveals patient with indigestion earlier, now gone. Tropx2 negative and ECG not showing any acute changes. Patient has no infectious/PNA sxs.   Will have patient f/u for outpatient stress test.     Henrry García MD  05/03/21 2053

## 2023-04-24 ENCOUNTER — HOSPITAL ENCOUNTER (EMERGENCY)
Age: 73
Discharge: HOME | End: 2023-04-24
Attending: EMERGENCY MEDICINE
Payer: MEDICARE

## 2023-04-24 ENCOUNTER — APPOINTMENT (OUTPATIENT)
Dept: CT IMAGING | Age: 73
End: 2023-04-24
Payer: MEDICARE

## 2023-04-24 ENCOUNTER — APPOINTMENT (OUTPATIENT)
Dept: GENERAL RADIOLOGY | Age: 73
End: 2023-04-24
Payer: MEDICARE

## 2023-04-24 VITALS
BODY MASS INDEX: 45.29 KG/M2 | DIASTOLIC BLOOD PRESSURE: 62 MMHG | WEIGHT: 288.58 LBS | OXYGEN SATURATION: 95 % | TEMPERATURE: 97.7 F | RESPIRATION RATE: 18 BRPM | HEIGHT: 67 IN | SYSTOLIC BLOOD PRESSURE: 126 MMHG | HEART RATE: 76 BPM

## 2023-04-24 DIAGNOSIS — R77.8 ELEVATED TROPONIN: ICD-10-CM

## 2023-04-24 DIAGNOSIS — R09.02 HYPOXIA: ICD-10-CM

## 2023-04-24 DIAGNOSIS — R06.00 DYSPNEA, UNSPECIFIED TYPE: Primary | ICD-10-CM

## 2023-04-24 LAB
ALBUMIN SERPL-MCNC: 4.2 G/DL (ref 3.4–5)
ALBUMIN/GLOB SERPL: 1.5 {RATIO} (ref 1.1–2.2)
ALP SERPL-CCNC: 39 U/L (ref 40–129)
ALT SERPL-CCNC: 48 U/L (ref 10–40)
ANION GAP SERPL CALCULATED.3IONS-SCNC: 11 MMOL/L (ref 3–16)
AST SERPL-CCNC: 72 U/L (ref 15–37)
BASE EXCESS BLDV CALC-SCNC: 1.4 MMOL/L (ref -2–3)
BASOPHILS # BLD: 0 K/UL (ref 0–0.2)
BASOPHILS NFR BLD: 0.2 %
BILIRUB SERPL-MCNC: 0.9 MG/DL (ref 0–1)
BUN SERPL-MCNC: 28 MG/DL (ref 7–20)
CALCIUM SERPL-MCNC: 9.8 MG/DL (ref 8.3–10.6)
CHLORIDE SERPL-SCNC: 99 MMOL/L (ref 99–110)
CO2 BLDV-SCNC: 30 MMOL/L
CO2 SERPL-SCNC: 28 MMOL/L (ref 21–32)
COHGB MFR BLDV: 0.6 % (ref 0–1.5)
CREAT SERPL-MCNC: 1.5 MG/DL (ref 0.8–1.3)
D DIMER: 0.73 UG/ML FEU (ref 0–0.6)
DEPRECATED RDW RBC AUTO: 14.6 % (ref 12.4–15.4)
DO-HGB MFR BLDV: 23.3 %
EKG ATRIAL RATE: 77 BPM
EKG DIAGNOSIS: NORMAL
EKG P AXIS: 63 DEGREES
EKG P-R INTERVAL: 188 MS
EKG Q-T INTERVAL: 348 MS
EKG QRS DURATION: 108 MS
EKG QTC CALCULATION (BAZETT): 393 MS
EKG R AXIS: -79 DEGREES
EKG T AXIS: 65 DEGREES
EKG VENTRICULAR RATE: 77 BPM
EOSINOPHIL # BLD: 0.2 K/UL (ref 0–0.6)
EOSINOPHIL NFR BLD: 1.6 %
GFR SERPLBLD CREATININE-BSD FMLA CKD-EPI: 49 ML/MIN/{1.73_M2}
GLUCOSE SERPL-MCNC: 192 MG/DL (ref 70–99)
HCO3 BLDV-SCNC: 28.7 MMOL/L (ref 24–28)
HCT VFR BLD AUTO: 52.7 % (ref 40.5–52.5)
HGB BLD-MCNC: 17.7 G/DL (ref 13.5–17.5)
LYMPHOCYTES # BLD: 1.2 K/UL (ref 1–5.1)
LYMPHOCYTES NFR BLD: 11.6 %
MCH RBC QN AUTO: 33.9 PG (ref 26–34)
MCHC RBC AUTO-ENTMCNC: 33.5 G/DL (ref 31–36)
MCV RBC AUTO: 101.3 FL (ref 80–100)
METHGB MFR BLDV: 0.2 % (ref 0–1.5)
MONOCYTES # BLD: 0.8 K/UL (ref 0–1.3)
MONOCYTES NFR BLD: 8.1 %
NEUTROPHILS # BLD: 7.9 K/UL (ref 1.7–7.7)
NEUTROPHILS NFR BLD: 78.5 %
NT-PROBNP SERPL-MCNC: <36 PG/ML (ref 0–124)
PCO2 BLDV: 53.4 MMHG (ref 41–51)
PH BLDV: 7.34 [PH] (ref 7.35–7.45)
PLATELET # BLD AUTO: 168 K/UL (ref 135–450)
PMV BLD AUTO: 8.5 FL (ref 5–10.5)
PO2 BLDV: 46.3 MMHG (ref 25–40)
POTASSIUM SERPL-SCNC: 4.5 MMOL/L (ref 3.5–5.1)
PROT SERPL-MCNC: 7 G/DL (ref 6.4–8.2)
RBC # BLD AUTO: 5.2 M/UL (ref 4.2–5.9)
SAO2 % BLDV: 77 %
SARS-COV-2 RDRP RESP QL NAA+PROBE: NOT DETECTED
SODIUM SERPL-SCNC: 138 MMOL/L (ref 136–145)
TROPONIN, HIGH SENSITIVITY: 51 NG/L (ref 0–22)
TROPONIN, HIGH SENSITIVITY: 64 NG/L (ref 0–22)
WBC # BLD AUTO: 10.1 K/UL (ref 4–11)

## 2023-04-24 PROCEDURE — 6360000004 HC RX CONTRAST MEDICATION: Performed by: EMERGENCY MEDICINE

## 2023-04-24 PROCEDURE — 83880 ASSAY OF NATRIURETIC PEPTIDE: CPT

## 2023-04-24 PROCEDURE — 36415 COLL VENOUS BLD VENIPUNCTURE: CPT

## 2023-04-24 PROCEDURE — 99285 EMERGENCY DEPT VISIT HI MDM: CPT

## 2023-04-24 PROCEDURE — 82803 BLOOD GASES ANY COMBINATION: CPT

## 2023-04-24 PROCEDURE — 6360000002 HC RX W HCPCS: Performed by: EMERGENCY MEDICINE

## 2023-04-24 PROCEDURE — 71275 CT ANGIOGRAPHY CHEST: CPT

## 2023-04-24 PROCEDURE — 80053 COMPREHEN METABOLIC PANEL: CPT

## 2023-04-24 PROCEDURE — 6370000000 HC RX 637 (ALT 250 FOR IP): Performed by: EMERGENCY MEDICINE

## 2023-04-24 PROCEDURE — 84484 ASSAY OF TROPONIN QUANT: CPT

## 2023-04-24 PROCEDURE — 93005 ELECTROCARDIOGRAM TRACING: CPT | Performed by: EMERGENCY MEDICINE

## 2023-04-24 PROCEDURE — 85025 COMPLETE CBC W/AUTO DIFF WBC: CPT

## 2023-04-24 PROCEDURE — 71046 X-RAY EXAM CHEST 2 VIEWS: CPT

## 2023-04-24 PROCEDURE — 96374 THER/PROPH/DIAG INJ IV PUSH: CPT

## 2023-04-24 PROCEDURE — 87635 SARS-COV-2 COVID-19 AMP PRB: CPT

## 2023-04-24 PROCEDURE — 85379 FIBRIN DEGRADATION QUANT: CPT

## 2023-04-24 RX ORDER — LORAZEPAM 2 MG/ML
0.5 INJECTION INTRAMUSCULAR ONCE
Status: COMPLETED | OUTPATIENT
Start: 2023-04-24 | End: 2023-04-24

## 2023-04-24 RX ORDER — ASPIRIN 325 MG
325 TABLET ORAL ONCE
Status: COMPLETED | OUTPATIENT
Start: 2023-04-24 | End: 2023-04-24

## 2023-04-24 RX ADMIN — LORAZEPAM 0.5 MG: 2 INJECTION INTRAMUSCULAR; INTRAVENOUS at 14:13

## 2023-04-24 RX ADMIN — ASPIRIN 325 MG: 325 TABLET ORAL at 14:05

## 2023-04-24 RX ADMIN — IOPAMIDOL 75 ML: 755 INJECTION, SOLUTION INTRAVENOUS at 14:28

## 2023-04-24 ASSESSMENT — PAIN SCALES - GENERAL
PAINLEVEL_OUTOF10: 0
PAINLEVEL_OUTOF10: 0

## 2023-04-24 ASSESSMENT — PAIN - FUNCTIONAL ASSESSMENT: PAIN_FUNCTIONAL_ASSESSMENT: 0-10

## 2023-04-24 NOTE — DISCHARGE INSTRUCTIONS
Come back to the emergency department if you change your mind about being admitted, for more shortness of breath, chest pain, or any other problems. Your troponin level is elevated, may be related to your shortness of breath stable.,  And I think the best way to get it taken care of would be cardiology evaluation, so if you feel any differently you should come back or if you change your mind. Otherwise, follow-up with your primary doctor and cardiologist as soon as possible for continued outpatient work-up.

## 2023-04-24 NOTE — ED NOTES
Patient prepared for and ready to be discharged. Dressed in clothes and given belongings. IV removed, pt tolerated well, no complications. Patient discharged at this time in no acute distress after pt verbalized understanding of discharge instructions. Reviewed medications, and when to return to the ED with patient. Encouraged follow up with PCP  Patient walked to [facility].      Marry Wick RN  04/24/23 [id number]

## 2023-04-24 NOTE — ED TRIAGE NOTES
Pt cc of sob and palpitations of heart. Pt has had irr heart rate for some time. Pt states current symptoms have been going on for 2 x days.

## 2023-04-24 NOTE — ED PROVIDER NOTES
Last appt: 3/12/21  Next appt: Not scheduled    Last filled: 2/3/22    
muscle once a week. VITAMIN D (CHOLECALCIFEROL) 76293 UNITS CAPS CAPSULE    Take 10,000 Units by mouth 2 times daily       Allergies     He is allergic to cyclobenzaprine. Physical Exam     INITIAL VITALS: BP: 114/68, Temp: 97.7 °F (36.5 °C), Heart Rate: 84, Resp: 16, SpO2: 96 %   Physical Exam  Constitutional:       Appearance: He is well-developed. HENT:      Head: Normocephalic and atraumatic. Cardiovascular:      Rate and Rhythm: Normal rate. Pulmonary:      Effort: Pulmonary effort is normal. No tachypnea, accessory muscle usage or respiratory distress. Breath sounds: No decreased breath sounds, wheezing (Lungs clear bilaterally), rhonchi or rales. Chest:      Chest wall: No tenderness. Abdominal:      Palpations: Abdomen is soft. Musculoskeletal:      Cervical back: Normal range of motion. Right lower leg: No tenderness. Edema present. Left lower leg: No tenderness. Edema present. Skin:     General: Skin is warm. Neurological:      General: No focal deficit present. Mental Status: He is alert and oriented to person, place, and time.                   Lacy Barber MD  04/24/23 [id number]       Lacy Barber MD  04/24/23 [id number]

## 2025-04-10 ENCOUNTER — HOSPITAL ENCOUNTER (EMERGENCY)
Age: 75
Discharge: ANOTHER HOSPITAL | End: 2025-04-10
Attending: EMERGENCY MEDICINE
Payer: MEDICARE

## 2025-04-10 ENCOUNTER — APPOINTMENT (OUTPATIENT)
Dept: CT IMAGING | Age: 75
End: 2025-04-10
Payer: MEDICARE

## 2025-04-10 ENCOUNTER — APPOINTMENT (OUTPATIENT)
Dept: GENERAL RADIOLOGY | Age: 75
End: 2025-04-10
Payer: MEDICARE

## 2025-04-10 VITALS
TEMPERATURE: 97.4 F | SYSTOLIC BLOOD PRESSURE: 177 MMHG | RESPIRATION RATE: 20 BRPM | OXYGEN SATURATION: 99 % | HEART RATE: 97 BPM | WEIGHT: 258.7 LBS | DIASTOLIC BLOOD PRESSURE: 71 MMHG | HEIGHT: 67 IN | BODY MASS INDEX: 40.6 KG/M2

## 2025-04-10 DIAGNOSIS — S02.2XXA CLOSED FRACTURE OF NASAL BONE, INITIAL ENCOUNTER: ICD-10-CM

## 2025-04-10 DIAGNOSIS — S02.32XA CLOSED FRACTURE OF LEFT ORBITAL FLOOR, INITIAL ENCOUNTER (HCC): ICD-10-CM

## 2025-04-10 DIAGNOSIS — J18.9 COMMUNITY ACQUIRED PNEUMONIA OF LEFT LOWER LOBE OF LUNG: ICD-10-CM

## 2025-04-10 DIAGNOSIS — H54.62 VISION LOSS OF LEFT EYE: Primary | ICD-10-CM

## 2025-04-10 LAB
ANION GAP SERPL CALCULATED.3IONS-SCNC: 12 MMOL/L (ref 3–16)
BASE EXCESS BLDV CALC-SCNC: 5.6 MMOL/L (ref -2–3)
BASOPHILS # BLD: 0 K/UL (ref 0–0.2)
BASOPHILS NFR BLD: 0.5 %
BUN SERPL-MCNC: 14 MG/DL (ref 7–20)
CALCIUM SERPL-MCNC: 9.8 MG/DL (ref 8.3–10.6)
CHLORIDE SERPL-SCNC: 97 MMOL/L (ref 99–110)
CO2 BLDV-SCNC: 36 MMOL/L
CO2 SERPL-SCNC: 27 MMOL/L (ref 21–32)
COHGB MFR BLDV: 1.4 % (ref 0–1.5)
CREAT SERPL-MCNC: 1.2 MG/DL (ref 0.8–1.3)
DEPRECATED RDW RBC AUTO: 16.8 % (ref 12.4–15.4)
DO-HGB MFR BLDV: 55.1 %
EKG ATRIAL RATE: 86 BPM
EKG DIAGNOSIS: NORMAL
EKG P AXIS: 72 DEGREES
EKG P-R INTERVAL: 214 MS
EKG Q-T INTERVAL: 322 MS
EKG QRS DURATION: 104 MS
EKG QTC CALCULATION (BAZETT): 385 MS
EKG R AXIS: -44 DEGREES
EKG T AXIS: 70 DEGREES
EKG VENTRICULAR RATE: 86 BPM
EOSINOPHIL # BLD: 0.1 K/UL (ref 0–0.6)
EOSINOPHIL NFR BLD: 1.5 %
ETHANOLAMINE SERPL-MCNC: NORMAL MG/DL (ref 0–0.08)
GFR SERPLBLD CREATININE-BSD FMLA CKD-EPI: 64 ML/MIN/{1.73_M2}
GLUCOSE SERPL-MCNC: 137 MG/DL (ref 70–99)
HCO3 BLDV-SCNC: 34.2 MMOL/L (ref 24–28)
HCT VFR BLD AUTO: 50.1 % (ref 40.5–52.5)
HGB BLD-MCNC: 16.8 G/DL (ref 13.5–17.5)
LYMPHOCYTES # BLD: 1.1 K/UL (ref 1–5.1)
LYMPHOCYTES NFR BLD: 13 %
MCH RBC QN AUTO: 30.6 PG (ref 26–34)
MCHC RBC AUTO-ENTMCNC: 33.5 G/DL (ref 31–36)
MCV RBC AUTO: 91.2 FL (ref 80–100)
METHGB MFR BLDV: 0.4 % (ref 0–1.5)
MONOCYTES # BLD: 0.7 K/UL (ref 0–1.3)
MONOCYTES NFR BLD: 8.2 %
NEUTROPHILS # BLD: 6.3 K/UL (ref 1.7–7.7)
NEUTROPHILS NFR BLD: 76.8 %
NT-PROBNP SERPL-MCNC: <36 PG/ML (ref 0–124)
PCO2 BLDV: 63 MMHG (ref 41–51)
PH BLDV: 7.34 [PH] (ref 7.35–7.45)
PLATELET # BLD AUTO: 200 K/UL (ref 135–450)
PMV BLD AUTO: 7.9 FL (ref 5–10.5)
PO2 BLDV: <30 MMHG (ref 25–40)
POTASSIUM SERPL-SCNC: 4.2 MMOL/L (ref 3.5–5.1)
RBC # BLD AUTO: 5.49 M/UL (ref 4.2–5.9)
SAO2 % BLDV: 44 %
SODIUM SERPL-SCNC: 136 MMOL/L (ref 136–145)
TROPONIN, HIGH SENSITIVITY: 71 NG/L (ref 0–22)
WBC # BLD AUTO: 8.2 K/UL (ref 4–11)

## 2025-04-10 PROCEDURE — 36415 COLL VENOUS BLD VENIPUNCTURE: CPT

## 2025-04-10 PROCEDURE — 99285 EMERGENCY DEPT VISIT HI MDM: CPT

## 2025-04-10 PROCEDURE — 70450 CT HEAD/BRAIN W/O DYE: CPT

## 2025-04-10 PROCEDURE — 83880 ASSAY OF NATRIURETIC PEPTIDE: CPT

## 2025-04-10 PROCEDURE — 6360000002 HC RX W HCPCS: Performed by: EMERGENCY MEDICINE

## 2025-04-10 PROCEDURE — 84484 ASSAY OF TROPONIN QUANT: CPT

## 2025-04-10 PROCEDURE — 93005 ELECTROCARDIOGRAM TRACING: CPT | Performed by: EMERGENCY MEDICINE

## 2025-04-10 PROCEDURE — 72125 CT NECK SPINE W/O DYE: CPT

## 2025-04-10 PROCEDURE — 82803 BLOOD GASES ANY COMBINATION: CPT

## 2025-04-10 PROCEDURE — 87040 BLOOD CULTURE FOR BACTERIA: CPT

## 2025-04-10 PROCEDURE — 96365 THER/PROPH/DIAG IV INF INIT: CPT

## 2025-04-10 PROCEDURE — 80048 BASIC METABOLIC PNL TOTAL CA: CPT

## 2025-04-10 PROCEDURE — 85025 COMPLETE CBC W/AUTO DIFF WBC: CPT

## 2025-04-10 PROCEDURE — 71045 X-RAY EXAM CHEST 1 VIEW: CPT

## 2025-04-10 PROCEDURE — 82077 ASSAY SPEC XCP UR&BREATH IA: CPT

## 2025-04-10 PROCEDURE — 96375 TX/PRO/DX INJ NEW DRUG ADDON: CPT

## 2025-04-10 PROCEDURE — 70486 CT MAXILLOFACIAL W/O DYE: CPT

## 2025-04-10 RX ORDER — FENTANYL CITRATE 50 UG/ML
50 INJECTION, SOLUTION INTRAMUSCULAR; INTRAVENOUS ONCE
Status: COMPLETED | OUTPATIENT
Start: 2025-04-10 | End: 2025-04-10

## 2025-04-10 RX ORDER — LEVOFLOXACIN 5 MG/ML
500 INJECTION, SOLUTION INTRAVENOUS EVERY 24 HOURS
Status: DISCONTINUED | OUTPATIENT
Start: 2025-04-10 | End: 2025-04-10 | Stop reason: HOSPADM

## 2025-04-10 RX ADMIN — FENTANYL CITRATE 50 MCG: 50 INJECTION INTRAMUSCULAR; INTRAVENOUS at 05:37

## 2025-04-10 RX ADMIN — LEVOFLOXACIN 500 MG: 5 INJECTION, SOLUTION INTRAVENOUS at 05:14

## 2025-04-10 ASSESSMENT — PAIN DESCRIPTION - LOCATION: LOCATION: FACE

## 2025-04-10 ASSESSMENT — PAIN DESCRIPTION - ORIENTATION: ORIENTATION: LEFT

## 2025-04-10 ASSESSMENT — ENCOUNTER SYMPTOMS
COUGH: 0
SHORTNESS OF BREATH: 1
GASTROINTESTINAL NEGATIVE: 1

## 2025-04-10 ASSESSMENT — PAIN - FUNCTIONAL ASSESSMENT: PAIN_FUNCTIONAL_ASSESSMENT: 0-10

## 2025-04-10 ASSESSMENT — LIFESTYLE VARIABLES
HOW MANY STANDARD DRINKS CONTAINING ALCOHOL DO YOU HAVE ON A TYPICAL DAY: 1 OR 2
HOW OFTEN DO YOU HAVE A DRINK CONTAINING ALCOHOL: MONTHLY OR LESS

## 2025-04-10 ASSESSMENT — PAIN SCALES - GENERAL
PAINLEVEL_OUTOF10: 8
PAINLEVEL_OUTOF10: 3

## 2025-04-10 NOTE — ED PROVIDER NOTES
[facility]  EMERGENCY DEPARTMENT ENCOUNTER          ATTENDING PHYSICIAN NOTE       Date of evaluation: 4/10/2025    Chief Complaint     Fall (Patient presents to ED via [facility] from home with report of two separate falls in the past several days. Patient reports a fall several days with an injury to the right periorbital region with noted bruising and swelling. Per patient and CFD, patient had an additional fall this morning where he states he was sitting on the edge of his bed and fell asleep, falling off the bed and injuring the left periorbital area. Reports taking 2 baby aspirin tablets daily.) and Facial Injury      History of Present Illness     Mj Jiménez is a 74 y.o. male who presents to the emerged from for evaluation of injuries after a fall.  Patient is a poor historian and keeps falling asleep in the middle of obtaining history.  Patient states that he has a history of narcolepsy and has had several episodes recently where he is fallen.  He states he has been under increased stress due to the recent passing of his ex-wife.  He states this evening he was going back from the bathroom and fell.  He is complaining of pain to the left side of his face as well as vision loss in the left eye.  He also has ecchymosis to the right side of the face but states that was from a fall several days ago.  He is also noted on arrival to be hypoxic with oxygen saturations in the high 80s to low 90s.  He states he has been feeling increasing shortness of breath over the last several days and has an old oxygen compressor that his ex-wife used and has been using that.  He denies any fevers or chills.  Denies any cough.  He denies any chest pain or pressure.  He denies any pain in his extremities.  He does note pain throughout his back but states that that is chronic for him and he has a spinal cord stimulator in place.    ASSESSMENT / PLAN  (MEDICAL DECISION MAKING)     INITIAL VITALS: BP: (!)

## 2025-04-11 LAB
BACTERIA BLD CULT ORG #2: NORMAL
BACTERIA BLD CULT: NORMAL

## 2025-04-14 LAB
BACTERIA BLD CULT ORG #2: NORMAL
BACTERIA BLD CULT: NORMAL

## (undated) DEVICE — ELECTRODE PT RET AD L9FT HI MOIST COND ADH HYDRGEL CORDED

## (undated) DEVICE — GAUZE,SPONGE,4"X4",16PLY,XRAY,STRL,LF: Brand: MEDLINE

## (undated) DEVICE — SUTURE VCRL SZ 3-0 L18IN ABSRB UD L26MM SH 1/2 CIR J864D

## (undated) DEVICE — SOLUTION IV IRRIG 500ML 0.9% SODIUM CHL 2F7123

## (undated) DEVICE — INTENDED FOR TISSUE SEPARATION, AND OTHER PROCEDURES THAT REQUIRE A SHARP SURGICAL BLADE TO PUNCTURE OR CUT.: Brand: BARD-PARKER ® STAINLESS STEEL BLADES

## (undated) DEVICE — HYPODERMIC SAFETY NEEDLE: Brand: MAGELLAN

## (undated) DEVICE — PACK PROCEDURE SURG EXTREMITY MFFOP CUST

## (undated) DEVICE — SUTURE ETHBND EXCEL SZ 0 L18IN NONABSORBABLE GRN L26MM MO-6 CX45D

## (undated) DEVICE — Z DISCONTINUED USE 2516375 APPLICATOR MEDICATED 3 CC CLR STRL CHLORAPREP

## (undated) DEVICE — TOWEL,OR,DSP,ST,BLUE,STD,4/PK,20PK/CS: Brand: MEDLINE

## (undated) DEVICE — DRAPE C ARM UNIV W41XL74IN CLR PLAS XR VELC CLSR POLY STRP

## (undated) DEVICE — CHLORAPREP 26ML ORANGE

## (undated) DEVICE — SUTURE MCRYL SZ 4-0 L27IN ABSRB UD L19MM PS-2 1/2 CIR PRIM Y426H

## (undated) DEVICE — THE PATIENT THERAPY CONTROLLER (PTC) IS A HANDHELD TOUCHSCREEN DEVICE, WHICH ALLOWS A PATIENT TO INITIATE A PRE-CONFIGURED SUPPLEMENTAL BOLUS OF MEDICATION FROM THEIR IMPLANTED PROGRAMMABLE PUMP. THE PATIENT REQUESTS A BOLUS BY PRESSING THE RX BUTTON ON THE PTC AND THEN PLACING IT OVER THEIR IMPLANTED PUMP.  THE PATIENT THERAPY CONTROLLER COMMUNICATES WITH THE IMPLANTED PUMP INITIATING THE SUPPLEMENTAL BOLUS DELIVERY.  THE SETTINGS FOR THE BOLUS DELIVERY ARE PROGRAMMED INTO THE PATIENT THERAPY CONTROLLER BY A HEALTHCARE CERTIFIED PROFESSIONAL (HCP) USING THE CONFIGURATION DEVICE.: Brand: PROMETRA PATIENT THERAPY CONTROLLER

## (undated) DEVICE — YANKAUER SUCTION INSTRUMENT NO CONTROL VENT, BULB TIP, CLEAR: Brand: YANKAUER

## (undated) DEVICE — MASTISOL ADHESIVE LIQ 2/3ML

## (undated) DEVICE — 3M™ IOBAN™ 2 ANTIMICROBIAL INCISE DRAPE 6650EZ: Brand: IOBAN™ 2

## (undated) DEVICE — SYRINGE, LUER LOCK, 10ML: Brand: MEDLINE

## (undated) DEVICE — Device

## (undated) DEVICE — Device: Brand: JELCO

## (undated) DEVICE — 6 ML SYRINGE LUER-LOCK TIP: Brand: MONOJECT

## (undated) DEVICE — GAUZE,SPONGE,4"X4",8PLY,STRL,LF,10/TRAY: Brand: MEDLINE

## (undated) DEVICE — 3M™ STERI-STRIP™ REINFORCED ADHESIVE SKIN CLOSURES, R1547, 1/2 IN X 4 IN (12 MM X 100 MM), 6 STRIPS/ENVELOPE: Brand: 3M™ STERI-STRIP™

## (undated) DEVICE — SYRINGE MED 30ML STD CLR PLAS LUERLOCK TIP N CTRL DISP

## (undated) DEVICE — PORT VLV 2 W NDL FREE SMRTSITE

## (undated) DEVICE — SYRINGE EPI 5CC GLS MTL TIP LOSS OF RESISTANCE

## (undated) DEVICE — MEDICINE CUP, GRADUATED, STER: Brand: MEDLINE

## (undated) DEVICE — TRAY ANES EPI 5CC GLS LL 18GA CUST

## (undated) DEVICE — DRAPE,SPLIT ,77X120: Brand: MEDLINE

## (undated) DEVICE — SYRINGE MED 10ML TRNSLUC BRL PLUNG BLK MRK POLYPR CTRL

## (undated) DEVICE — 3 ML SYRINGE LUER-LOCK TIP: Brand: MONOJECT

## (undated) DEVICE — STERILE LATEX POWDER-FREE SURGICAL GLOVESWITH NITRILE COATING: Brand: PROTEXIS

## (undated) DEVICE — POSITIONER HD W8XH4XL8.5IN RASPBERRY FOAM SLT

## (undated) DEVICE — 1010 S-DRAPE TOWEL DRAPE 10/BX: Brand: STERI-DRAPE™

## (undated) DEVICE — PEN: MARKING STD 100/CS: Brand: MEDICAL ACTION INDUSTRIES

## (undated) DEVICE — STERILE POLYISOPRENE POWDER-FREE SURGICAL GLOVES: Brand: PROTEXIS

## (undated) DEVICE — 3M™ TEGADERM™ TRANSPARENT FILM DRESSING FRAME STYLE, 1626W, 4 IN X 4-3/4 IN (10 CM X 12 CM), 50/CT 4CT/CASE: Brand: 3M™ TEGADERM™

## (undated) DEVICE — SUTURING DEVICE: Brand: FIXATE ™